# Patient Record
Sex: FEMALE | Race: WHITE | Employment: OTHER | URBAN - METROPOLITAN AREA
[De-identification: names, ages, dates, MRNs, and addresses within clinical notes are randomized per-mention and may not be internally consistent; named-entity substitution may affect disease eponyms.]

---

## 2018-05-03 PROBLEM — Z79.01 LONG TERM (CURRENT) USE OF ANTICOAGULANTS: Status: ACTIVE | Noted: 2018-05-03

## 2021-05-14 ENCOUNTER — HOSPITAL ENCOUNTER (OUTPATIENT)
Dept: LAB | Age: 71
Discharge: HOME | End: 2021-05-14
Payer: MEDICARE

## 2021-05-14 DIAGNOSIS — I48.21 PERMANENT ATRIAL FIBRILLATION (HCC): ICD-10-CM

## 2021-05-14 LAB
ANION GAP SERPL CALC-SCNC: 5 MMOL/L (ref 7–16)
BASOPHILS # BLD: 0 K/UL (ref 0–0.2)
BASOPHILS NFR BLD: 1 % (ref 0–2)
BUN SERPL-MCNC: 21 MG/DL (ref 8–23)
CALCIUM SERPL-MCNC: 8.9 MG/DL (ref 8.3–10.4)
CHLORIDE SERPL-SCNC: 103 MMOL/L (ref 98–107)
CO2 SERPL-SCNC: 30 MMOL/L (ref 21–32)
CREAT SERPL-MCNC: 1.35 MG/DL (ref 0.6–1)
DIFFERENTIAL METHOD BLD: NORMAL
EOSINOPHIL # BLD: 0.1 K/UL (ref 0–0.8)
EOSINOPHIL NFR BLD: 2 % (ref 0.5–7.8)
ERYTHROCYTE [DISTWIDTH] IN BLOOD BY AUTOMATED COUNT: 12 % (ref 11.9–14.6)
GLUCOSE SERPL-MCNC: 128 MG/DL (ref 65–100)
HCT VFR BLD AUTO: 42.1 % (ref 35.8–46.3)
HGB BLD-MCNC: 13.5 G/DL (ref 11.7–15.4)
IMM GRANULOCYTES # BLD AUTO: 0 K/UL (ref 0–0.5)
IMM GRANULOCYTES NFR BLD AUTO: 0 % (ref 0–5)
INR PPP: 2.9
LYMPHOCYTES # BLD: 1.7 K/UL (ref 0.5–4.6)
LYMPHOCYTES NFR BLD: 25 % (ref 13–44)
MAGNESIUM SERPL-MCNC: 1.9 MG/DL (ref 1.8–2.4)
MCH RBC QN AUTO: 30.4 PG (ref 26.1–32.9)
MCHC RBC AUTO-ENTMCNC: 32.1 G/DL (ref 31.4–35)
MCV RBC AUTO: 94.8 FL (ref 79.6–97.8)
MONOCYTES # BLD: 0.5 K/UL (ref 0.1–1.3)
MONOCYTES NFR BLD: 8 % (ref 4–12)
NEUTS SEG # BLD: 4.4 K/UL (ref 1.7–8.2)
NEUTS SEG NFR BLD: 65 % (ref 43–78)
NRBC # BLD: 0 K/UL (ref 0–0.2)
PLATELET # BLD AUTO: 217 K/UL (ref 150–450)
PMV BLD AUTO: 10 FL (ref 9.4–12.3)
POTASSIUM SERPL-SCNC: 4.3 MMOL/L (ref 3.5–5.1)
PROTHROMBIN TIME: 30.3 SEC (ref 12.5–14.7)
RBC # BLD AUTO: 4.44 M/UL (ref 4.05–5.2)
SODIUM SERPL-SCNC: 138 MMOL/L (ref 136–145)
WBC # BLD AUTO: 6.7 K/UL (ref 4.3–11.1)

## 2021-05-14 PROCEDURE — 80048 BASIC METABOLIC PNL TOTAL CA: CPT

## 2021-05-14 PROCEDURE — 83735 ASSAY OF MAGNESIUM: CPT

## 2021-05-14 PROCEDURE — 36415 COLL VENOUS BLD VENIPUNCTURE: CPT

## 2021-05-14 PROCEDURE — 85025 COMPLETE CBC W/AUTO DIFF WBC: CPT

## 2021-05-14 PROCEDURE — 85610 PROTHROMBIN TIME: CPT

## 2021-05-20 ENCOUNTER — ANESTHESIA EVENT (OUTPATIENT)
Dept: SURGERY | Age: 71
End: 2021-05-20
Payer: MEDICARE

## 2021-05-20 NOTE — PROGRESS NOTES
Patient pre-assessment complete for Gen Change  scheduled for 1100, arrival time 0930. Patient verified using . Patient instructed to bring all home medications in labeled bottles on the day of procedure. NPO status reinforced. Patient instructed to HOLD all DM medications, coumadin. Instructed they can take all other medications excluding vitamins & supplements. Patient verbalizes understanding of all instructions & denies any questions at this time.

## 2021-05-21 ENCOUNTER — ANESTHESIA (OUTPATIENT)
Dept: SURGERY | Age: 71
End: 2021-05-21
Payer: MEDICARE

## 2021-05-21 ENCOUNTER — HOSPITAL ENCOUNTER (OUTPATIENT)
Dept: CARDIAC CATH/INVASIVE PROCEDURES | Age: 71
Discharge: HOME | End: 2021-05-21
Attending: INTERNAL MEDICINE | Admitting: INTERNAL MEDICINE
Payer: MEDICARE

## 2021-05-21 VITALS
SYSTOLIC BLOOD PRESSURE: 139 MMHG | RESPIRATION RATE: 12 BRPM | HEART RATE: 86 BPM | HEIGHT: 68 IN | WEIGHT: 290 LBS | DIASTOLIC BLOOD PRESSURE: 86 MMHG | BODY MASS INDEX: 43.95 KG/M2 | OXYGEN SATURATION: 95 % | TEMPERATURE: 97.8 F

## 2021-05-21 DIAGNOSIS — I48.21 PERMANENT ATRIAL FIBRILLATION (HCC): ICD-10-CM

## 2021-05-21 DIAGNOSIS — I10 ESSENTIAL HYPERTENSION: ICD-10-CM

## 2021-05-21 DIAGNOSIS — Z95.0 CARDIAC PACEMAKER: ICD-10-CM

## 2021-05-21 DIAGNOSIS — I49.5 SSS (SICK SINUS SYNDROME) (HCC): ICD-10-CM

## 2021-05-21 LAB
ALBUMIN SERPL-MCNC: 3.8 G/DL (ref 3.2–4.6)
ALBUMIN/GLOB SERPL: 1.1 {RATIO} (ref 1.2–3.5)
ALP SERPL-CCNC: 73 U/L (ref 50–136)
ALT SERPL-CCNC: 19 U/L (ref 12–65)
ANION GAP SERPL CALC-SCNC: 4 MMOL/L (ref 7–16)
AST SERPL-CCNC: 18 U/L (ref 15–37)
ATRIAL RATE: 94 BPM
BASOPHILS # BLD: 0 K/UL (ref 0–0.2)
BASOPHILS NFR BLD: 1 % (ref 0–2)
BILIRUB SERPL-MCNC: 0.9 MG/DL (ref 0.2–1.1)
BUN SERPL-MCNC: 26 MG/DL (ref 8–23)
CALCIUM SERPL-MCNC: 9.5 MG/DL (ref 8.3–10.4)
CALCULATED R AXIS, ECG10: 24 DEGREES
CALCULATED T AXIS, ECG11: 77 DEGREES
CHLORIDE SERPL-SCNC: 105 MMOL/L (ref 98–107)
CO2 SERPL-SCNC: 29 MMOL/L (ref 21–32)
CREAT SERPL-MCNC: 1.34 MG/DL (ref 0.6–1)
DIAGNOSIS, 93000: NORMAL
DIFFERENTIAL METHOD BLD: NORMAL
EOSINOPHIL # BLD: 0.1 K/UL (ref 0–0.8)
EOSINOPHIL NFR BLD: 2 % (ref 0.5–7.8)
ERYTHROCYTE [DISTWIDTH] IN BLOOD BY AUTOMATED COUNT: 12 % (ref 11.9–14.6)
GLOBULIN SER CALC-MCNC: 3.4 G/DL (ref 2.3–3.5)
GLUCOSE SERPL-MCNC: 125 MG/DL (ref 65–100)
HCT VFR BLD AUTO: 41.4 % (ref 35.8–46.3)
HGB BLD-MCNC: 13.6 G/DL (ref 11.7–15.4)
IMM GRANULOCYTES # BLD AUTO: 0 K/UL (ref 0–0.5)
IMM GRANULOCYTES NFR BLD AUTO: 0 % (ref 0–5)
INR PPP: 1.2
LYMPHOCYTES # BLD: 1.5 K/UL (ref 0.5–4.6)
LYMPHOCYTES NFR BLD: 24 % (ref 13–44)
MAGNESIUM SERPL-MCNC: 2 MG/DL (ref 1.8–2.4)
MCH RBC QN AUTO: 30.4 PG (ref 26.1–32.9)
MCHC RBC AUTO-ENTMCNC: 32.9 G/DL (ref 31.4–35)
MCV RBC AUTO: 92.4 FL (ref 79.6–97.8)
MONOCYTES # BLD: 0.6 K/UL (ref 0.1–1.3)
MONOCYTES NFR BLD: 9 % (ref 4–12)
NEUTS SEG # BLD: 4.2 K/UL (ref 1.7–8.2)
NEUTS SEG NFR BLD: 65 % (ref 43–78)
NRBC # BLD: 0 K/UL (ref 0–0.2)
PLATELET # BLD AUTO: 202 K/UL (ref 150–450)
PMV BLD AUTO: 9.6 FL (ref 9.4–12.3)
POTASSIUM SERPL-SCNC: 4.1 MMOL/L (ref 3.5–5.1)
PROT SERPL-MCNC: 7.2 G/DL (ref 6.3–8.2)
PROTHROMBIN TIME: 15.3 SEC (ref 12.5–14.7)
Q-T INTERVAL, ECG07: 298 MS
QRS DURATION, ECG06: 86 MS
QTC CALCULATION (BEZET), ECG08: 368 MS
RBC # BLD AUTO: 4.48 M/UL (ref 4.05–5.2)
SODIUM SERPL-SCNC: 138 MMOL/L (ref 136–145)
VENTRICULAR RATE, ECG03: 92 BPM
WBC # BLD AUTO: 6.4 K/UL (ref 4.3–11.1)

## 2021-05-21 PROCEDURE — 77030010507 HC ADH SKN DERMBND J&J -B

## 2021-05-21 PROCEDURE — 74011000272 HC RX REV CODE- 272: Performed by: INTERNAL MEDICINE

## 2021-05-21 PROCEDURE — 77030022704 HC SUT VLOC COVD -B

## 2021-05-21 PROCEDURE — 83735 ASSAY OF MAGNESIUM: CPT

## 2021-05-21 PROCEDURE — 74011000250 HC RX REV CODE- 250: Performed by: INTERNAL MEDICINE

## 2021-05-21 PROCEDURE — 80053 COMPREHEN METABOLIC PANEL: CPT

## 2021-05-21 PROCEDURE — 33227 REMOVE&REPLACE PM GEN SINGL: CPT

## 2021-05-21 PROCEDURE — 74011000250 HC RX REV CODE- 250: Performed by: STUDENT IN AN ORGANIZED HEALTH CARE EDUCATION/TRAINING PROGRAM

## 2021-05-21 PROCEDURE — 76060000033 HC ANESTHESIA 1 TO 1.5 HR: Performed by: INTERNAL MEDICINE

## 2021-05-21 PROCEDURE — 77030041279 HC DRSG PRMSL AG MDII -B

## 2021-05-21 PROCEDURE — 74011000250 HC RX REV CODE- 250: Performed by: NURSE ANESTHETIST, CERTIFIED REGISTERED

## 2021-05-21 PROCEDURE — 77030031304 HC WAVWGD EIGR DISP INVO -D

## 2021-05-21 PROCEDURE — 85610 PROTHROMBIN TIME: CPT

## 2021-05-21 PROCEDURE — 33227 REMOVE&REPLACE PM GEN SINGL: CPT | Performed by: INTERNAL MEDICINE

## 2021-05-21 PROCEDURE — 93005 ELECTROCARDIOGRAM TRACING: CPT | Performed by: INTERNAL MEDICINE

## 2021-05-21 PROCEDURE — C1786 PMKR, SINGLE, RATE-RESP: HCPCS

## 2021-05-21 PROCEDURE — 77030033067 HC SUT PDO STRATFX SPIR J&J -B

## 2021-05-21 PROCEDURE — 74011250636 HC RX REV CODE- 250/636: Performed by: INTERNAL MEDICINE

## 2021-05-21 PROCEDURE — 85025 COMPLETE CBC W/AUTO DIFF WBC: CPT

## 2021-05-21 PROCEDURE — 74011250636 HC RX REV CODE- 250/636: Performed by: NURSE ANESTHETIST, CERTIFIED REGISTERED

## 2021-05-21 RX ORDER — SODIUM CHLORIDE, SODIUM LACTATE, POTASSIUM CHLORIDE, CALCIUM CHLORIDE 600; 310; 30; 20 MG/100ML; MG/100ML; MG/100ML; MG/100ML
INJECTION, SOLUTION INTRAVENOUS
Status: DISCONTINUED | OUTPATIENT
Start: 2021-05-21 | End: 2021-05-21 | Stop reason: HOSPADM

## 2021-05-21 RX ORDER — PROPOFOL 10 MG/ML
INJECTION, EMULSION INTRAVENOUS
Status: DISCONTINUED | OUTPATIENT
Start: 2021-05-21 | End: 2021-05-21 | Stop reason: HOSPADM

## 2021-05-21 RX ORDER — SODIUM CHLORIDE 9 MG/ML
75 INJECTION, SOLUTION INTRAVENOUS CONTINUOUS
Status: DISCONTINUED | OUTPATIENT
Start: 2021-05-21 | End: 2021-05-21 | Stop reason: HOSPADM

## 2021-05-21 RX ORDER — DOXYCYCLINE 100 MG/1
100 TABLET ORAL 2 TIMES DAILY
Qty: 10 TABLET | Refills: 0 | Status: SHIPPED | OUTPATIENT
Start: 2021-05-21 | End: 2021-05-26

## 2021-05-21 RX ORDER — LIDOCAINE HYDROCHLORIDE 20 MG/ML
INJECTION, SOLUTION EPIDURAL; INFILTRATION; INTRACAUDAL; PERINEURAL AS NEEDED
Status: DISCONTINUED | OUTPATIENT
Start: 2021-05-21 | End: 2021-05-21 | Stop reason: HOSPADM

## 2021-05-21 RX ORDER — CEFAZOLIN SODIUM/WATER 2 G/20 ML
2 SYRINGE (ML) INTRAVENOUS
Status: DISCONTINUED | OUTPATIENT
Start: 2021-05-21 | End: 2021-05-21 | Stop reason: DRUGHIGH

## 2021-05-21 RX ORDER — PROPOFOL 10 MG/ML
INJECTION, EMULSION INTRAVENOUS AS NEEDED
Status: DISCONTINUED | OUTPATIENT
Start: 2021-05-21 | End: 2021-05-21 | Stop reason: HOSPADM

## 2021-05-21 RX ADMIN — SODIUM CHLORIDE, SODIUM LACTATE, POTASSIUM CHLORIDE, AND CALCIUM CHLORIDE: 600; 310; 30; 20 INJECTION, SOLUTION INTRAVENOUS at 12:02

## 2021-05-21 RX ADMIN — PHENYLEPHRINE HYDROCHLORIDE 50 MCG: 10 INJECTION INTRAVENOUS at 12:26

## 2021-05-21 RX ADMIN — LIDOCAINE HYDROCHLORIDE 200 MG: 10; .005 INJECTION, SOLUTION EPIDURAL; INFILTRATION; INTRACAUDAL; PERINEURAL at 12:19

## 2021-05-21 RX ADMIN — CEFAZOLIN 3 G: 1 INJECTION, POWDER, FOR SOLUTION INTRAVENOUS at 12:12

## 2021-05-21 RX ADMIN — PHENYLEPHRINE HYDROCHLORIDE 50 MCG: 10 INJECTION INTRAVENOUS at 12:23

## 2021-05-21 RX ADMIN — LIDOCAINE HYDROCHLORIDE 50 MG: 20 INJECTION, SOLUTION EPIDURAL; INFILTRATION; INTRACAUDAL; PERINEURAL at 12:09

## 2021-05-21 RX ADMIN — PHENYLEPHRINE HYDROCHLORIDE 100 MCG: 10 INJECTION INTRAVENOUS at 12:31

## 2021-05-21 RX ADMIN — NEOMYCIN AND POLYMYXIN B SULFATES 1000 ML: 40; 200000 SOLUTION IRRIGATION at 12:19

## 2021-05-21 RX ADMIN — PROPOFOL 140 MCG/KG/MIN: 10 INJECTION, EMULSION INTRAVENOUS at 12:11

## 2021-05-21 RX ADMIN — PROPOFOL 50 MG: 10 INJECTION, EMULSION INTRAVENOUS at 12:11

## 2021-05-21 NOTE — PROCEDURES
: Juan Sanabria. Gerry Jay MD    REFERRING: Aquiles Dominguez. Isabelle Pulido MD    Pre-Procedure Diagnosis  1. PPM generator RYAN. 2. Documented non-reversible symptomatic bradycardia due to sinus node dysfunction    Procedure Performed  1. Single Chamber PPM Gen Change    Anesthesia: MAC     Complications: None     Estimated Blood Loss: Less than 10 mL     Fluoro Exposure: 0 minutes    Specimens: * No specimens in log *     Patient Information and Indications: The procedure, indications, risks, benefits, and alternatives were discussed with the patient and family members, who desired to proceed after questions were answered and informed consent was documented. Procedure: After informed consent was obtained, the patient was brought to the Electrophysiology Laboratory in a fasting state and was prepped and draped in sterile fashion. Prophylactic antibiotic was administered prior to skin incision. Local anesthetic (sensorcaine) was delivered to the left pectoral region and an incision was made directly over the lower prior surgical scar. The subcutaneous pocket was opened using blunt dissection and electrocautery, and adequate hemostasis was established. The device was freed from overlying fibrotic tissue and the lead was freed to give enough slack for device exchange. The lead was removed from the old generator and tested using the PSA revealing excellent pacing parameters. The lead pin was then cleaned with antibiotic soaked gauze, dried gently, and attached to a new pacemaker generator. The pin was directly observed to pass the tip electrode, and the ring hex wrench screws were secured, and leads tug tested. The device and leads were gently positioned within the pocket. The pocket was irrigated copiously with a saline antimicrobial solution prior to device positioning. The device and lead was tested a second time prior to pocket closure.   The wound was closed with two layers of absorbable suture (3-0 Monocryl, Stratafix) followed by skin closure with 4-0 V-Loc in a running fashion. The patient tolerated the procedure well and left the lab in good condition. There were no complications. All sharps and sponges were counted x 2. Device and Lead Information  Pulse Generator Model #  Serial # Location Implant/Explant   [id number] [facility] [id number] Left Pectoral Implant [id number]   Old Device: Shirley Mae's [id number] DOI [id number]    Lead Model Number  Serial Number Lead position Implant/Explant   RV [id number] [facility] [id number] RV [place] Chronic 09.21.2009     Lead Sensitivity and Threshold  Lead R or P sensitivity (mv) Threshold (V) Threshold PW (msec) Impedance (ohms) Final output Voltage (V) Final PW (msec)   RV 5.7 1.3 0.4 463 2.5 0.4     Bradycardia Settings  [place] Mode LRL USL   VVIR 60 130       Impression: 1. Successful single chamber PPM generator replacement. Plan: Same day discharge.  -Abx. -Device clinic follow up in 1-2 weeks.  -Routine cardiac care per Dr. Jorge Alberto Adkins. Marine Hyman MD, [address]  Clinical Cardiac Electrophysiology  [facility] Cardiology    5/21/2021  12:49 PM

## 2021-05-21 NOTE — PROGRESS NOTES
Patient received to [address] [place] [address] room # 9  Ambulatory from [facility]. Patient scheduled for battery change today with Dr Chana Higgins. Procedure reviewed & questions answered, voiced good understanding consent obtained & placed on chart. All medications and medical history reviewed. Will prep patient per orders. Patient & family updated on plan of care.       The patient has a fraility score of 3-MANAGING WELL, based on independent of adl's

## 2021-05-21 NOTE — H&P
[facility] CARDIOLOGY History & Physical                   Assessment/Plan:   Symptomatic bradycardia  Atrial fibrillation  Essential hypertension    72year old female with a history of persistent atrial fibrillation s/p SCPM implant which is now at [facility]. She will require a changeout. She has undergone informed consent and will proceed with planned procedure under MAC. PACEMAKER  Additionally, I discussed with the patient the potential risks of pacemaker implantation, including the risk of bleeding, infection, venous occlusion, DVT/PE, pneumothorax, cardiac tamponade, perforation, need for urgent open heart surgery, device/lead failure, lead dislodgement, heart attack, stroke, arrhythmia, radiation skin injury, kidney damage/failure oversedation, respiratory arrest, and even death. The patient understands these risks in the context of the potential benefits of the device implantation, and agrees to proceed. TOTAL TIME: 25 minutes, >50% during counseling and coordination of care    [employer]. Luis F Marquez MD, MS  Clinical Cardiac Electrophysiology  [address] [facility] Cardiology    5/21/2021  11:53 AM      Subjective:     Patient is a 72 y.o. female who presents with SCPM which is at [facility]. She will undergo device changeout today. The patient otherwise denies chest pain, dyspnea, presyncope, syncope or lateralizing symptoms. Past Medical History:   Diagnosis Date    Arrhythmia     a-fib, pacemaker /  katlyn-tachy at timesprior to pacemaker    Arthritis     self diagnosed in knees    Atrial fibrillation ([address] 10/28/2011    Cardiac pacemaker     Diabetes ([address]     type 2;  BS , oral med.   A1c  6.1     DM w/o complication type II ([address] 10/28/2011    Endometrial hyperplasia     Endometrial hyperplasia with atypia 10/28/2011    GERD (gastroesophageal reflux disease)     nexium daily    Hypertension     Mitral valve regurgitation     Mixed hyperlipidemia     Morbid obesity (HCC)     Neuropathy     feet    Overweight 10/28/2011    SSS (sick sinus syndrome) [address]     Unspecified essential hypertension 10/28/2011      Past Surgical History:   Procedure Laterality Date    HX GYN      D&C x2    HX HEENT      right eye corneal transplant and cat.  HX OTHER SURGICAL      pilonial cyst    HX PACEMAKER      '09    RI ABDOMEN SURGERY PROC UNLISTED      xochitl      Allergies   Allergen Reactions    Ciprofloxacin Other (comments)     hyperactivity    Pcn [Penicillins] Unknown (comments)     As a child    Sulfa (Sulfonamide Antibiotics) Rash     Social History     Tobacco Use    Smoking status: Never Smoker    Smokeless tobacco: Never Used   Substance Use Topics    Alcohol use: Yes     Comment: occassional-no more than once monthly      FH:   Family History   Problem Relation Age of Onset   Shahida Schaeffer Cancer Mother     Hypertension Mother     Heart Disease Mother     Diabetes Father     Stroke Father     Heart Disease Father         ROS      Objective:       Visit Vitals  BP (!) 136/93 (BP 1 Location: Left upper arm)   Pulse 91   Ht 5' 8\" (1.727 m)   Wt 290 lb (131.5 kg)   SpO2 96%   Breastfeeding No   BMI 44.09 kg/m²       No intake/output data recorded. No intake/output data recorded. Physical Exam  Vitals and nursing note reviewed. HENT:      Head: Normocephalic. Mouth/Throat:      Pharynx: No oropharyngeal exudate. Eyes:      General: No scleral icterus. Pupils: Pupils are equal, round, and reactive to light. Neck:      Vascular: No JVD. Cardiovascular:      Rate and Rhythm: Normal rate and regular rhythm. Heart sounds: Normal heart sounds. No murmur heard. No friction rub. No gallop. Pulmonary:      Effort: Pulmonary effort is normal. No respiratory distress. Breath sounds: Normal breath sounds. No wheezing or rales. Abdominal:      General: Bowel sounds are normal. There is no distension. Palpations: Abdomen is soft. Tenderness: There is no abdominal tenderness. There is no rebound. Musculoskeletal:         General: No tenderness. Normal range of motion. Cervical back: Normal range of motion and neck supple. Skin:     General: Skin is warm and dry. Findings: No erythema or rash. Neurological:      Mental Status: She is alert and oriented to person, place, and time. Cranial Nerves: No cranial nerve deficit. Motor: No abnormal muscle tone. Coordination: Coordination normal.   Psychiatric:         Mood and Affect: Affect normal.         Judgment: Judgment normal.       ECG: Atrial fibrillation, nonspecific T wave abnormality.      Data Review:   Labs:   Recent Labs     05/21/21  0923      K 4.1   MG 2.0   BUN 26*   CREA 1.34*   *   WBC 6.4   HGB 13.6   HCT 41.4      INR 1.2

## 2021-05-21 NOTE — ANESTHESIA PREPROCEDURE EVALUATION
Relevant Problems   RESPIRATORY SYSTEM   (+) Sleep apnea      CARDIOVASCULAR   (+) Arrhythmia   (+) Atrial fibrillation (HCC)   (+) Cardiac pacemaker   (+) Essential hypertension   (+) Mitral valve regurgitation   (+) SSS (sick sinus syndrome) (HCC)      GASTROINTESTINAL   (+) GERD (gastroesophageal reflux disease)      ENDOCRINE   (+) Arthritis   (+) Morbid obesity (HCC)   (+) Type II or unspecified type diabetes mellitus without mention of complication, not stated as uncontrolled       Anesthetic History   No history of anesthetic complications            Review of Systems / Medical History  Patient summary reviewed and pertinent labs reviewed    Pulmonary        Sleep apnea: CPAP           Neuro/Psych   Within defined limits           Cardiovascular    Hypertension        Dysrhythmias (SSS - on coumadin - INR 1.2) : atrial fibrillation  Pacemaker    Exercise tolerance: <4 METS  Comments: ECHO '20 - preserved EF with mod AI/mild MR   GI/Hepatic/Renal  Within defined limits              Endo/Other    Diabetes: well controlled, type 2    Morbid obesity     Other Findings            Physical Exam    Airway  Mallampati: II  TM Distance: 4 - 6 cm  Neck ROM: decreased range of motion   Mouth opening: Normal     Cardiovascular    Rhythm: regular  Rate: normal         Dental         Pulmonary  Breath sounds clear to auscultation               Abdominal         Other Findings            Anesthetic Plan    ASA: 3  Anesthesia type: total IV anesthesia          Induction: Intravenous  Anesthetic plan and risks discussed with: Patient

## 2021-05-21 NOTE — DISCHARGE INSTRUCTIONS
PACEMAKER INSTRUCTIONS SHEET  · Keep your incision dry for 10 days. DO NOT put salves, ointments, and/or lotions on the incision. Only take a tub bath during this time; NO showers. · The pieces of tape on the incision will come off by themselves when you begin washing the site. Please do not pull or tear them off. · You may use your pacemaker arm; but DO NOT raise the arm higher than your shoulder for the first two weeks to prevent the pacemaker lead from moving. DO NOT immobilize your pacemaker arm. · Call us IMMEDIATELY if you develop fever, pain, redness, and/or drainage at the pacemaker arm. · Do not lift more than 10 pounds for 2 weeks and 20 pounds for 1 month. · Microwaves WILL NOT harm your pacemaker. Warning does not apply to you. · Avoid activities which can reprogram your pacemaker such as arc welding, ham radios, and tanning booths. At airports, always show your pacemaker identification card. You may walk through the metal detector, but do not allow the hand held wand near your pacemaker. Do not have a MRI or NMR scan without talking to your cardiologist.  · Your pacemakers function will be evaluated over the telephone. Within 3 weeks you should receive a schedule. If you do not receive a schedule, or if you have questions, you may call [facility] Cardiology. · Remove the battery from the transmitter after each use. Always keep a spare 9 volt battery. · The pacemaker battery is tested by the heart rate with the magnet applied. This test is done each time you transmit your ECG so it is very important that you follow your schedule. · Carry your pacemaker identification card at all times. Within 6 weeks, you should receive your permanent card. Keep your temporary card as a spare. Call [facility] Cardiology if you do not receive your card or if you lose your card. · Always show the doctor or dentist your pacemaker identification card.   · If you have questions about your pacemaker or office appointment, please call the office of [facility] Cardiology at [phone]. · Following the pacemaker implant, you will need to return to the clinic to see your doctor within 1 week. If you did not receive an appointment, please call [phone]. Patient Education      Doxycycline (Acticlate, Adoxa, Avidoxy, Monodox) - (By mouth)   Why this medicine is used:   Treats and prevents infections, treats rosacea, or severe acne. Contact a nurse or doctor right away if you have:  · Blistering, peeling, red skin rash  · Severe or bloody diarrhea  · Severe headache, dizziness, vision changes  · Burning, pain, or irritation in your upper stomach or throat  · Sudden and severe stomach pain, nausea, vomiting, lightheadedness     Common side effects:  · Discoloration of teeth in children  · Sores or white patches on your lips, mouth, or throat  © 2017 [facility] Information is for End User's use only and may not be sold, redistributed or otherwise used for commercial purposes.

## 2021-05-21 NOTE — ANESTHESIA POSTPROCEDURE EVALUATION
Procedure(s):  PACEMAKER GEN CHANGE.    total IV anesthesia    Anesthesia Post Evaluation      Multimodal analgesia: multimodal analgesia used between 6 hours prior to anesthesia start to PACU discharge  Patient location during evaluation: bedside  Patient participation: complete - patient participated  Level of consciousness: awake and responsive to light touch  Pain management: adequate  Airway patency: patent  Anesthetic complications: no  Cardiovascular status: acceptable, hemodynamically stable, blood pressure returned to baseline and stable  Respiratory status: acceptable, unassisted, spontaneous ventilation and nonlabored ventilation  Hydration status: acceptable  Post anesthesia nausea and vomiting:  controlled      INITIAL Post-op Vital signs:   Vitals Value Taken Time   /90 05/21/21 1356   Temp     Pulse 89 05/21/21 1359   Resp     SpO2 96 % 05/21/21 1359   Vitals shown include unvalidated device data.

## 2022-03-19 PROBLEM — Z79.01 LONG TERM (CURRENT) USE OF ANTICOAGULANTS: Status: ACTIVE | Noted: 2018-05-03

## 2022-05-27 ENCOUNTER — ANTI-COAG VISIT (OUTPATIENT)
Dept: CARDIOLOGY CLINIC | Age: 72
End: 2022-05-27
Payer: MEDICARE

## 2022-05-27 DIAGNOSIS — I48.91 ATRIAL FIBRILLATION (HCC): ICD-10-CM

## 2022-05-27 DIAGNOSIS — Z79.01 LONG TERM (CURRENT) USE OF ANTICOAGULANTS: Primary | ICD-10-CM

## 2022-05-27 LAB
POC INR: 2.1
PROTHROMBIN TIME, POC: NORMAL
VALID INTERNAL CONTROL, POC: YES

## 2022-05-27 PROCEDURE — 85610 PROTHROMBIN TIME: CPT | Performed by: INTERNAL MEDICINE

## 2022-05-27 PROCEDURE — 93793 ANTICOAG MGMT PT WARFARIN: CPT | Performed by: INTERNAL MEDICINE

## 2022-05-27 NOTE — PATIENT INSTRUCTIONS
Reminder: Please contact the Coumadin Clinic at [phone]  when you have medication changes. Examples, new medications, antibiotics, discontinued medications, new supplements, missed doses of warfarin or if you took extra doses of warfarin. This also includes OTC medications. Notifying us helps reduce the possibility of high and low INR's. In addition, if warfarin needs to be held for any procedures, please have surgeon or physician's office contact us before holding anticoagulant. Thanks, [facility] Cardiology Coumadin Clinic.

## 2022-05-27 NOTE — PROGRESS NOTES
Anticoagulation Summary  As of 2022    INR goal:  2.0-3.0   TTR:  --   INR used for dosin.1 (2022)   Warfarin maintenance plan:  5 mg (5 mg x 1) every Mon; 7.5 mg (5 mg x 1.5) all other days   Weekly warfarin total:  50 mg   Plan last modified:  Vish Cardoso MA (2022)   Next INR check:     Target end date:   Indefinite    Indications    Long term (current) use of anticoagulants [Z79.01]  Atrial fibrillation ([facility] [address] [I48.91]             Anticoagulation Episode Summary     INR check location:  Anticoagulation Clinic    Preferred lab:      Send INR reminders to:  \[facility]\"" CARDIOLOGY ITA PT    Comments:        Anticoagulation Care Providers     Provider Role Specialty Phone number    Ava Witt MD [facility] Cardiology [phone]

## 2022-06-24 ENCOUNTER — ANTI-COAG VISIT (OUTPATIENT)
Dept: CARDIOLOGY CLINIC | Age: 72
End: 2022-06-24
Payer: MEDICARE

## 2022-06-24 DIAGNOSIS — Z79.01 LONG TERM (CURRENT) USE OF ANTICOAGULANTS: Primary | ICD-10-CM

## 2022-06-24 DIAGNOSIS — I48.91 ATRIAL FIBRILLATION (HCC): ICD-10-CM

## 2022-06-24 LAB
POC INR: 2.4
PROTHROMBIN TIME, POC: NORMAL
VALID INTERNAL CONTROL, POC: YES

## 2022-06-24 PROCEDURE — 85610 PROTHROMBIN TIME: CPT | Performed by: INTERNAL MEDICINE

## 2022-06-24 PROCEDURE — 93793 ANTICOAG MGMT PT WARFARIN: CPT | Performed by: INTERNAL MEDICINE

## 2022-06-24 NOTE — PATIENT INSTRUCTIONS
Reminder: Please contact the Coumadin Clinic at [phone]  when you have medication changes. Examples, new medications, antibiotics, discontinued medications, new supplements, missed doses of warfarin or if you took extra doses of warfarin. This also includes OTC medications. Notifying us helps reduce the possibility of high and low INR's. In addition, if warfarin needs to be held for any procedures, please have surgeon or physician's office contact us before holding anticoagulant. Thanks, [facility] Cardiology Coumadin Clinic.

## 2022-06-24 NOTE — PROGRESS NOTES
Anticoagulation Summary  As of 2022    INR goal:  2.0-3.0   TTR:  100.0 % (2.6 wk)   INR used for dosin.4 (2022)   Warfarin maintenance plan:  5 mg (5 mg x 1) every Wed; 7.5 mg (5 mg x 1.5) all other days   Weekly warfarin total:  50 mg   Plan last modified:  Samantha Salazar MA (2022)   Next INR check:  2022   Target end date:   Indefinite    Indications    Long term (current) use of anticoagulants [Z79.01]  Atrial fibrillation ([facility] [address] [I48.91]             Anticoagulation Episode Summary     INR check location:  Anticoagulation Clinic    Preferred lab:      Send INR reminders to:  \[facility]\"" CARDIOLOGY ITA HARRIS    Comments:        Anticoagulation Care Providers     Provider Role Specialty Phone number    Leah Álvarez MD [facility] Cardiology [phone]

## 2022-07-26 ENCOUNTER — ANTI-COAG VISIT (OUTPATIENT)
Dept: CARDIOLOGY CLINIC | Age: 72
End: 2022-07-26
Payer: MEDICARE

## 2022-07-26 DIAGNOSIS — Z79.01 LONG TERM (CURRENT) USE OF ANTICOAGULANTS: Primary | ICD-10-CM

## 2022-07-26 DIAGNOSIS — I48.91 ATRIAL FIBRILLATION (HCC): ICD-10-CM

## 2022-07-26 LAB
POC INR: 2.3
PROTHROMBIN TIME, POC: NORMAL

## 2022-07-26 PROCEDURE — 93793 ANTICOAG MGMT PT WARFARIN: CPT | Performed by: INTERNAL MEDICINE

## 2022-07-26 PROCEDURE — 85610 PROTHROMBIN TIME: CPT | Performed by: INTERNAL MEDICINE

## 2022-07-26 NOTE — PROGRESS NOTES
Anticoagulation Summary  As of 2022      INR goal:  2.0-3.0   TTR:  100.0 % (1.7 mo)   INR used for dosin.3 (2022)   Warfarin maintenance plan:  5 mg (5 mg x 1) every Wed; 7.5 mg (5 mg x 1.5) all other days   Weekly warfarin total:  50 mg   Plan last modified:  Miguel Lombardo MA (2022)   Next INR check:  2022   Target end date:   Indefinite    Indications    Long term (current) use of anticoagulants [Z79.01]  Atrial fibrillation ([facility] [address] [I48.91]                 Anticoagulation Episode Summary       INR check location:  Anticoagulation Clinic    Preferred lab:      Send INR reminders to:  [facility] CARDIOLOGY ITA PT    Comments:            Anticoagulation Care Providers       Provider Role Specialty Phone number    Linette Sánchez MD [facility] Cardiology [phone]

## 2022-08-05 NOTE — TELEPHONE ENCOUNTER
Warfarin 5mg - 1 to 1.5 tablets daily verified in last AntiCoag note, dated 7/26/22. Medication pended & sent to Dr. Shira Zheng for signature.  //pg

## 2022-08-08 RX ORDER — WARFARIN SODIUM 5 MG/1
TABLET ORAL
Qty: 135 TABLET | Refills: 3 | Status: SHIPPED | OUTPATIENT
Start: 2022-08-08

## 2022-08-23 ENCOUNTER — ANTI-COAG VISIT (OUTPATIENT)
Dept: CARDIOLOGY CLINIC | Age: 72
End: 2022-08-23
Payer: MEDICARE

## 2022-08-23 DIAGNOSIS — I48.91 ATRIAL FIBRILLATION (HCC): ICD-10-CM

## 2022-08-23 DIAGNOSIS — Z79.01 LONG TERM (CURRENT) USE OF ANTICOAGULANTS: Primary | ICD-10-CM

## 2022-08-23 LAB
POC INR: 2.8
PROTHROMBIN TIME, POC: NORMAL

## 2022-08-23 PROCEDURE — 85610 PROTHROMBIN TIME: CPT | Performed by: INTERNAL MEDICINE

## 2022-08-23 PROCEDURE — 93793 ANTICOAG MGMT PT WARFARIN: CPT | Performed by: INTERNAL MEDICINE

## 2022-09-12 RX ORDER — CARVEDILOL 25 MG/1
TABLET ORAL
Qty: 180 TABLET | OUTPATIENT
Start: 2022-09-12

## 2022-09-12 RX ORDER — LOSARTAN POTASSIUM AND HYDROCHLOROTHIAZIDE 25; 100 MG/1; MG/1
1 TABLET ORAL DAILY
Qty: 90 TABLET | Refills: 1 | Status: SHIPPED | OUTPATIENT
Start: 2022-09-12

## 2022-09-12 RX ORDER — CARVEDILOL 25 MG/1
25 TABLET ORAL 2 TIMES DAILY WITH MEALS
Qty: 180 TABLET | Refills: 1 | Status: SHIPPED | OUTPATIENT
Start: 2022-09-12

## 2022-09-12 NOTE — TELEPHONE ENCOUNTER
Pt needs refills on Losartan-hydrochlorothiazide 100-25 mg and  Carvedilol 25 mg sent [facility] in [place] on [facility] [address] [facility]. Please call pt with any questions. Thank you.

## 2022-09-12 NOTE — TELEPHONE ENCOUNTER
Losartan HCTZ 100-25mg daily & Carvedilol 25mg BID verified in last office note.  Medications pended & sent to Dr. Ness Vizcarra for approval. //pg

## 2022-09-20 ENCOUNTER — ANTI-COAG VISIT (OUTPATIENT)
Dept: CARDIOLOGY CLINIC | Age: 72
End: 2022-09-20
Payer: MEDICARE

## 2022-09-20 DIAGNOSIS — Z79.01 LONG TERM (CURRENT) USE OF ANTICOAGULANTS: Primary | ICD-10-CM

## 2022-09-20 DIAGNOSIS — I48.91 ATRIAL FIBRILLATION (HCC): ICD-10-CM

## 2022-09-20 LAB
POC INR: 1.5
PROTHROMBIN TIME, POC: YES

## 2022-09-20 PROCEDURE — 85610 PROTHROMBIN TIME: CPT | Performed by: INTERNAL MEDICINE

## 2022-09-20 PROCEDURE — 93793 ANTICOAG MGMT PT WARFARIN: CPT | Performed by: INTERNAL MEDICINE

## 2022-09-20 NOTE — PATIENT INSTRUCTIONS
Reminder: Please contact the Coumadin Clinic at [phone]  when you have medication changes. Examples, new medications, antibiotics, discontinued medications, new supplements, missed doses of warfarin or if you took extra doses of warfarin. This also includes OTC medications. Notifying us helps reduce the possibility of high and low INR's. In addition, if warfarin needs to be held for any procedures, please have surgeon or physician's office contact us before holding anticoagulant. Thanks, [facility] Cardiology Coumadin Clinic.

## 2022-09-20 NOTE — PROGRESS NOTES
Pt is below range, this is likely due to pt missing several days of warfarin. Will increase and recheck in 2 weeks//KM    Anticoagulation Summary  As of 2022      INR goal:  2.0-3.0   TTR:  89.9 % (3.5 mo)   INR used for dosin.5 (2022)   Warfarin maintenance plan:  5 mg (5 mg x 1) every Wed; 7.5 mg (5 mg x 1.5) all other days   Weekly warfarin total:  50 mg   Plan last modified:  Winston Tejeda MA (2022)   Next INR check:  10/4/2022   Target end date:   Indefinite    Indications    Long term (current) use of anticoagulants [Z79.01]  Atrial fibrillation ([facility] [address] [I48.91]                 Anticoagulation Episode Summary       INR check location:  Anticoagulation Clinic    Preferred lab:      Send INR reminders to:  [facility] CARDIOLOGY ITA [place]    Comments:            Anticoagulation Care Providers       Provider Role Specialty Phone number    Lauri Avalos MD [facility] Cardiology [phone]

## 2022-10-04 ENCOUNTER — ANTI-COAG VISIT (OUTPATIENT)
Dept: CARDIOLOGY CLINIC | Age: 72
End: 2022-10-04
Payer: MEDICARE

## 2022-10-04 DIAGNOSIS — Z79.01 LONG TERM (CURRENT) USE OF ANTICOAGULANTS: Primary | ICD-10-CM

## 2022-10-04 DIAGNOSIS — I48.91 ATRIAL FIBRILLATION (HCC): ICD-10-CM

## 2022-10-04 LAB
POC INR: 2.1
PROTHROMBIN TIME, POC: YES

## 2022-10-04 PROCEDURE — 85610 PROTHROMBIN TIME: CPT | Performed by: INTERNAL MEDICINE

## 2022-10-04 PROCEDURE — 93793 ANTICOAG MGMT PT WARFARIN: CPT | Performed by: INTERNAL MEDICINE

## 2022-10-04 NOTE — PATIENT INSTRUCTIONS
Reminder: Please contact the Coumadin Clinic at [phone]  when you have medication changes. Examples, new medications, antibiotics, discontinued medications, new supplements, missed doses of warfarin or if you took extra doses of warfarin. This also includes OTC medications. Notifying us helps reduce the possibility of high and low INR's. In addition, if warfarin needs to be held for any procedures, please have surgeon or physician's office contact us before holding anticoagulant. Thanks, [facility] Cardiology Coumadin Clinic.

## 2022-10-04 NOTE — PROGRESS NOTES
Anticoagulation Summary  As of 10/4/2022      INR goal:  2.0-3.0   TTR:  81.3 % (4 mo)   INR used for dosin.1 (10/4/2022)   Warfarin maintenance plan:  5 mg (5 mg x 1) every Wed; 7.5 mg (5 mg x 1.5) all other days   Weekly warfarin total:  50 mg   Plan last modified:  Sofia Edward MA (2022)   Next INR check:  10/18/2022   Target end date:   Indefinite    Indications    Long term (current) use of anticoagulants [Z79.01]  Atrial fibrillation ([facility] [address] [I48.91]                 Anticoagulation Episode Summary       INR check location:  Anticoagulation Clinic    Preferred lab:      Send INR reminders to:  [facility] CARDIOLOGY ITA PT    Comments:            Anticoagulation Care Providers       Provider Role Specialty Phone number    Girma Velasquez MD [facility] Cardiology [phone]

## 2022-10-18 ENCOUNTER — ANTI-COAG VISIT (OUTPATIENT)
Dept: CARDIOLOGY CLINIC | Age: 72
End: 2022-10-18
Payer: MEDICARE

## 2022-10-18 DIAGNOSIS — Z79.01 LONG TERM (CURRENT) USE OF ANTICOAGULANTS: Primary | ICD-10-CM

## 2022-10-18 DIAGNOSIS — I48.91 ATRIAL FIBRILLATION (HCC): ICD-10-CM

## 2022-10-18 LAB
POC INR: 3.8
PROTHROMBIN TIME, POC: YES

## 2022-10-18 PROCEDURE — 93793 ANTICOAG MGMT PT WARFARIN: CPT | Performed by: INTERNAL MEDICINE

## 2022-10-18 PROCEDURE — 85610 PROTHROMBIN TIME: CPT | Performed by: INTERNAL MEDICINE

## 2022-10-18 NOTE — PATIENT INSTRUCTIONS
Reminder: Please contact the Coumadin Clinic at [phone]  when you have medication changes. Examples, new medications, antibiotics, discontinued medications, new supplements, missed doses of warfarin or if you took extra doses of warfarin. This also includes OTC medications. Notifying us helps reduce the possibility of high and low INR's. In addition, if warfarin needs to be held for any procedures, please have surgeon or physician's office contact us before holding anticoagulant. Thanks, [facility] Cardiology Coumadin Clinic.

## 2022-10-18 NOTE — PROGRESS NOTES
Pt is above range, pt states that she has not consumed her normal amount of Vit K. Will decrease and recheck in 2 weeks//KM    Anticoagulation Summary  As of 10/18/2022      INR goal:  2.0-3.0   TTR:  78.4 % (4.5 mo)   INR used for dosing:  3.8 (10/18/2022)   Warfarin maintenance plan:  5 mg (5 mg x 1) every Wed; 7.5 mg (5 mg x 1.5) all other days   Weekly warfarin total:  50 mg   Plan last modified:  Yesi Ramsey MA (5/27/2022)   Next INR check:     Target end date:   Indefinite    Indications    Long term (current) use of anticoagulants [Z79.01]  Atrial fibrillation ([facility] [address] [I48.91]                 Anticoagulation Episode Summary       INR check location:  Anticoagulation Clinic    Preferred lab:      Send INR reminders to:  [facility] CARDIOLOGY ITA [place]    Comments:            Anticoagulation Care Providers       Provider Role Specialty Phone number    Darien Farley MD [facility] Cardiology [phone]

## 2022-11-01 ENCOUNTER — ANTI-COAG VISIT (OUTPATIENT)
Dept: CARDIOLOGY CLINIC | Age: 72
End: 2022-11-01
Payer: MEDICARE

## 2022-11-01 DIAGNOSIS — Z79.01 LONG TERM (CURRENT) USE OF ANTICOAGULANTS: Primary | ICD-10-CM

## 2022-11-01 DIAGNOSIS — I48.91 ATRIAL FIBRILLATION (HCC): ICD-10-CM

## 2022-11-01 LAB
POC INR: 3.4
PROTHROMBIN TIME, POC: YES

## 2022-11-01 PROCEDURE — 93793 ANTICOAG MGMT PT WARFARIN: CPT | Performed by: INTERNAL MEDICINE

## 2022-11-01 PROCEDURE — 85610 PROTHROMBIN TIME: CPT | Performed by: INTERNAL MEDICINE

## 2022-11-01 NOTE — PATIENT INSTRUCTIONS
Reminder: Please contact the Coumadin Clinic at [phone]  when you have medication changes. Examples, new medications, antibiotics, discontinued medications, new supplements, missed doses of warfarin or if you took extra doses of warfarin. This also includes OTC medications. Notifying us helps reduce the possibility of high and low INR's. In addition, if warfarin needs to be held for any procedures, please have surgeon or physician's office contact us before holding anticoagulant. Thanks, [facility] Cardiology Coumadin Clinic.

## 2022-11-01 NOTE — PROGRESS NOTES
Pt is above range, will decrease weekly dose and recheck in 2 weeks//KM    Anticoagulation Summary  As of 11/1/2022      INR goal:  2.0-3.0   TTR:  71.0 % (4.9 mo)   INR used for dosing:  3.4 (11/1/2022)   Warfarin maintenance plan:  5 mg (5 mg x 1) every Wed, Sat; 7.5 mg (5 mg x 1.5) all other days; Starting 11/1/2022   Weekly warfarin total:  47.5 mg   Plan last modified:  Francia Neff MA (11/1/2022)   Next INR check:  11/15/2022   Target end date:   Indefinite    Indications    Long term (current) use of anticoagulants [Z79.01]  Atrial fibrillation ([facility] [address] [I48.91]                 Anticoagulation Episode Summary       INR check location:  Anticoagulation Clinic    Preferred lab:      Send INR reminders to:  \[facility]\"" CARDIOLOGY ITA [facility]    Comments:            Anticoagulation Care Providers       Provider Role Specialty Phone number    Blue Owusu MD [facility] Cardiology [phone]

## 2022-11-10 PROCEDURE — 93294 REM INTERROG EVL PM/LDLS PM: CPT | Performed by: INTERNAL MEDICINE

## 2022-11-10 PROCEDURE — 93296 REM INTERROG EVL PM/IDS: CPT | Performed by: INTERNAL MEDICINE

## 2022-11-10 RX ORDER — DILTIAZEM HYDROCHLORIDE 120 MG/1
CAPSULE, COATED, EXTENDED RELEASE ORAL
Qty: 90 CAPSULE | Refills: 3 | Status: SHIPPED | OUTPATIENT
Start: 2022-11-10

## 2022-11-15 ENCOUNTER — ANTI-COAG VISIT (OUTPATIENT)
Dept: CARDIOLOGY CLINIC | Age: 72
End: 2022-11-15
Payer: MEDICARE

## 2022-11-15 DIAGNOSIS — Z79.01 LONG TERM (CURRENT) USE OF ANTICOAGULANTS: Primary | ICD-10-CM

## 2022-11-15 DIAGNOSIS — I48.91 ATRIAL FIBRILLATION (HCC): ICD-10-CM

## 2022-11-15 LAB
POC INR: 2
PROTHROMBIN TIME, POC: YES

## 2022-11-15 PROCEDURE — 85610 PROTHROMBIN TIME: CPT | Performed by: INTERNAL MEDICINE

## 2022-11-15 PROCEDURE — 93793 ANTICOAG MGMT PT WARFARIN: CPT | Performed by: INTERNAL MEDICINE

## 2022-11-15 RX ORDER — CARVEDILOL 25 MG/1
25 TABLET ORAL 2 TIMES DAILY WITH MEALS
Qty: 180 TABLET | Refills: 0 | Status: SHIPPED | OUTPATIENT
Start: 2022-11-15

## 2022-11-15 NOTE — PROGRESS NOTES
Anticoagulation Summary  As of 11/15/2022      INR goal:  2.0-3.0   TTR:  71.0 % (5.4 mo)   INR used for dosin.0 (11/15/2022)   Warfarin maintenance plan:  5 mg (5 mg x 1) every Wed, Sat; 7.5 mg (5 mg x 1.5) all other days; Starting 11/15/2022   Weekly warfarin total:  47.5 mg   Plan last modified:  Marita Cuellar MA (2022)   Next INR check:  2022   Target end date:   Indefinite    Indications    Long term (current) use of anticoagulants [Z79.01]  Atrial fibrillation ([facility] [address] [I48.91]                 Anticoagulation Episode Summary       INR check location:  Anticoagulation Clinic    Preferred lab:      Send INR reminders to:  [facility] CARDIOLOGY ITA PT    Comments:            Anticoagulation Care Providers       Provider Role Specialty Phone number    Quang Wang MD [facility] Cardiology [phone]

## 2022-11-15 NOTE — TELEPHONE ENCOUNTER
Pt was last seen June 2021. Pt is scheduled with Dr. Edd Mccauley January 18th. Per last office note, pt is currently taking carvedilol 25mg BID.

## 2022-11-15 NOTE — PATIENT INSTRUCTIONS
Reminder: Please contact the Coumadin Clinic at [phone]  when you have medication changes. Examples, new medications, antibiotics, discontinued medications, new supplements, missed doses of warfarin or if you took extra doses of warfarin. This also includes OTC medications. Notifying us helps reduce the possibility of high and low INR's. In addition, if warfarin needs to be held for any procedures, please have surgeon or physician's office contact us before holding anticoagulant. Thanks, [facility] Cardiology Coumadin Clinic.

## 2022-11-22 DIAGNOSIS — Z95.0 CARDIAC PACEMAKER: Primary | ICD-10-CM

## 2022-11-22 DIAGNOSIS — I49.5 SSS (SICK SINUS SYNDROME) (HCC): ICD-10-CM

## 2022-11-28 DIAGNOSIS — I49.5 SSS (SICK SINUS SYNDROME) (HCC): ICD-10-CM

## 2022-11-28 DIAGNOSIS — Z95.0 CARDIAC PACEMAKER: ICD-10-CM

## 2022-11-28 DIAGNOSIS — Z95.0 CARDIAC PACEMAKER: Primary | ICD-10-CM

## 2022-11-29 ENCOUNTER — ANTI-COAG VISIT (OUTPATIENT)
Dept: CARDIOLOGY CLINIC | Age: 72
End: 2022-11-29
Payer: MEDICARE

## 2022-11-29 DIAGNOSIS — I48.91 ATRIAL FIBRILLATION (HCC): ICD-10-CM

## 2022-11-29 DIAGNOSIS — Z79.01 LONG TERM (CURRENT) USE OF ANTICOAGULANTS: Primary | ICD-10-CM

## 2022-11-29 LAB
POC INR: 2.3
PROTHROMBIN TIME, POC: YES

## 2022-11-29 PROCEDURE — 85610 PROTHROMBIN TIME: CPT | Performed by: INTERNAL MEDICINE

## 2022-11-29 PROCEDURE — 93793 ANTICOAG MGMT PT WARFARIN: CPT | Performed by: INTERNAL MEDICINE

## 2022-11-29 NOTE — PROGRESS NOTES
Anticoagulation Summary  As of 2022      INR goal:  2.0-3.0   TTR:  73.3 % (5.9 mo)   INR used for dosin.3 (2022)   Warfarin maintenance plan:  5 mg (5 mg x 1) every Wed, Sat; 7.5 mg (5 mg x 1.5) all other days; Starting 2022   Weekly warfarin total:  47.5 mg   Plan last modified:  Geoffrey Post MA (2022)   Next INR check:  2022   Target end date:   Indefinite    Indications    Long term (current) use of anticoagulants [Z79.01]  Atrial fibrillation ([facility] [address] [I48.91]                 Anticoagulation Episode Summary       INR check location:  Anticoagulation Clinic    Preferred lab:      Send INR reminders to:  [facility] CARDIOLOGY ITA PT    Comments:            Anticoagulation Care Providers       Provider Role Specialty Phone number    Madie Goetz MD [facility] Cardiology [phone]

## 2022-11-29 NOTE — PATIENT INSTRUCTIONS
Reminder: Please contact the Coumadin Clinic at [phone]  when you have medication changes. Examples, new medications, antibiotics, discontinued medications, new supplements, missed doses of warfarin or if you took extra doses of warfarin. This also includes OTC medications. Notifying us helps reduce the possibility of high and low INR's. In addition, if warfarin needs to be held for any procedures, please have surgeon or physician's office contact us before holding anticoagulant. Thanks, [facility] Cardiology Coumadin Clinic.

## 2022-12-19 RX ORDER — LOSARTAN POTASSIUM AND HYDROCHLOROTHIAZIDE 25; 100 MG/1; MG/1
1 TABLET ORAL DAILY
Qty: 30 TABLET | Refills: 0 | Status: SHIPPED | OUTPATIENT
Start: 2022-12-19

## 2022-12-19 NOTE — TELEPHONE ENCOUNTER
Pt is scheduled for yearly follow up on 1/18/23. Per last office note, pt is currently taking losartan- HCTZ 100-25 mg.

## 2022-12-19 NOTE — TELEPHONE ENCOUNTER
Patient called stated she needed to get a refill on her losartan hydrochlorothiazide 100-25 mg. Please review and follow up with patient.  Thanks

## 2022-12-27 ENCOUNTER — ANTI-COAG VISIT (OUTPATIENT)
Dept: CARDIOLOGY CLINIC | Age: 72
End: 2022-12-27
Payer: MEDICARE

## 2022-12-27 DIAGNOSIS — Z79.01 LONG TERM (CURRENT) USE OF ANTICOAGULANTS: Primary | ICD-10-CM

## 2022-12-27 DIAGNOSIS — I48.91 ATRIAL FIBRILLATION (HCC): ICD-10-CM

## 2022-12-27 LAB
POC INR: 4.5
PROTHROMBIN TIME, POC: YES

## 2022-12-27 PROCEDURE — 93793 ANTICOAG MGMT PT WARFARIN: CPT | Performed by: INTERNAL MEDICINE

## 2022-12-27 PROCEDURE — 85610 PROTHROMBIN TIME: CPT | Performed by: INTERNAL MEDICINE

## 2022-12-27 NOTE — PROGRESS NOTES
Pt is above range, pt denies any diet or med changes. Will decrease and recheck in 2 weeks//KM    Anticoagulation Summary  As of 2022      INR goal:  2.0-3.0   TTR:  67.6 % (6.8 mo)   INR used for dosin.5 (2022)   Warfarin maintenance plan:  5 mg (5 mg x 1) every Wed, Sat; 7.5 mg (5 mg x 1.5) all other days; Starting 2022   Weekly warfarin total:  47.5 mg   Plan last modified:  Sofia Edward MA (2022)   Next INR check:     Target end date:   Indefinite    Indications    Long term (current) use of anticoagulants [Z79.01]  Atrial fibrillation ([facility] [address] [I48.91]                 Anticoagulation Episode Summary       INR check location:  Anticoagulation Clinic    Preferred lab:      Send INR reminders to:  [facility] CARDIOLOGY ITA PT    Comments:            Anticoagulation Care Providers       Provider Role Specialty Phone number    Girma Velasquez MD [facility] Cardiology [phone]

## 2022-12-27 NOTE — PATIENT INSTRUCTIONS
Reminder: Please contact the Coumadin Clinic at [phone]  when you have medication changes. Examples, new medications, antibiotics, discontinued medications, new supplements, missed doses of warfarin or if you took extra doses of warfarin. This also includes OTC medications. Notifying us helps reduce the possibility of high and low INR's. In addition, if warfarin needs to be held for any procedures, please have surgeon or physician's office contact us before holding anticoagulant. Thanks, [facility] Cardiology Coumadin Clinic.

## 2022-12-29 ENCOUNTER — TELEPHONE (OUTPATIENT)
Dept: CARDIOLOGY CLINIC | Age: 72
End: 2022-12-29

## 2022-12-29 NOTE — TELEPHONE ENCOUNTER
Attempted calling pt, but was unable to leave a voicemail. Pt's medication was sent to the pharmacy on 12/19/22.

## 2022-12-29 NOTE — TELEPHONE ENCOUNTER
MEDICATION REFILL REQUEST      Name of Medication:  losartan-Hydrochlorothiazide  Dose:  100-25 mg  Frequency:  1 a day  Quantity:  30  Days' supply:  30 with refills      Pharmacy Name/Location:  [id number]  Pt is out please call in asa  ,pt said she called in before Risa and asked for refill but nothing was called in

## 2022-12-30 NOTE — TELEPHONE ENCOUNTER
Called and spoke with the pt. Informed her that the medication was sent to [facility] in [address]. Pt states she told us the wrong pharmacy, but will pick it up there.

## 2023-01-10 ENCOUNTER — ANTI-COAG VISIT (OUTPATIENT)
Dept: CARDIOLOGY CLINIC | Age: 73
End: 2023-01-10
Payer: MEDICARE

## 2023-01-10 DIAGNOSIS — I48.91 ATRIAL FIBRILLATION (HCC): ICD-10-CM

## 2023-01-10 DIAGNOSIS — Z79.01 LONG TERM (CURRENT) USE OF ANTICOAGULANTS: Primary | ICD-10-CM

## 2023-01-10 LAB
POC INR: 2.2
PROTHROMBIN TIME, POC: YES

## 2023-01-10 PROCEDURE — 93793 ANTICOAG MGMT PT WARFARIN: CPT | Performed by: INTERNAL MEDICINE

## 2023-01-10 PROCEDURE — 85610 PROTHROMBIN TIME: CPT | Performed by: INTERNAL MEDICINE

## 2023-01-10 NOTE — PROGRESS NOTES
Anticoagulation Summary  As of 1/10/2023      INR goal:  2.0-3.0   TTR:  65.5 % (7.3 mo)   INR used for dosin.2 (1/10/2023)   Warfarin maintenance plan:  5 mg (5 mg x 1) every Wed, Sat; 7.5 mg (5 mg x 1.5) all other days; Starting 1/10/2023   Weekly warfarin total:  47.5 mg   Plan last modified:  Amilcar Wray MA (2022)   Next INR check:  2023   Target end date:   Indefinite    Indications    Long term (current) use of anticoagulants [Z79.01]  Atrial fibrillation ([facility] [address] [I48.91]                 Anticoagulation Episode Summary       INR check location:  Anticoagulation Clinic    Preferred lab:      Send INR reminders to:  [facility] CARDIOLOGY [facility]    Comments:            Anticoagulation Care Providers       Provider Role Specialty Phone number    Ryan Porter MD [facility] Cardiology [phone]

## 2023-01-10 NOTE — PATIENT INSTRUCTIONS
Reminder: Please contact the Coumadin Clinic at [phone]  when you have medication changes. Examples, new medications, antibiotics, discontinued medications, new supplements, missed doses of warfarin or if you took extra doses of warfarin. This also includes OTC medications. Notifying us helps reduce the possibility of high and low INR's. In addition, if warfarin needs to be held for any procedures, please have surgeon or physician's office contact us before holding anticoagulant. Thanks, [facility] Cardiology Coumadin Clinic.

## 2023-01-18 ENCOUNTER — OFFICE VISIT (OUTPATIENT)
Dept: CARDIOLOGY CLINIC | Age: 73
End: 2023-01-18
Payer: MEDICARE

## 2023-01-18 VITALS
HEIGHT: 68 IN | SYSTOLIC BLOOD PRESSURE: 130 MMHG | HEART RATE: 115 BPM | BODY MASS INDEX: 40.16 KG/M2 | WEIGHT: 265 LBS | DIASTOLIC BLOOD PRESSURE: 70 MMHG

## 2023-01-18 DIAGNOSIS — E78.2 MIXED HYPERLIPIDEMIA: ICD-10-CM

## 2023-01-18 DIAGNOSIS — I49.5 SSS (SICK SINUS SYNDROME) (HCC): ICD-10-CM

## 2023-01-18 DIAGNOSIS — I48.21 PERMANENT ATRIAL FIBRILLATION (HCC): Primary | ICD-10-CM

## 2023-01-18 PROCEDURE — 99214 OFFICE O/P EST MOD 30 MIN: CPT | Performed by: INTERNAL MEDICINE

## 2023-01-18 PROCEDURE — 3075F SYST BP GE 130 - 139MM HG: CPT | Performed by: INTERNAL MEDICINE

## 2023-01-18 PROCEDURE — 1123F ACP DISCUSS/DSCN MKR DOCD: CPT | Performed by: INTERNAL MEDICINE

## 2023-01-18 PROCEDURE — G8427 DOCREV CUR MEDS BY ELIG CLIN: HCPCS | Performed by: INTERNAL MEDICINE

## 2023-01-18 PROCEDURE — 3078F DIAST BP <80 MM HG: CPT | Performed by: INTERNAL MEDICINE

## 2023-01-18 PROCEDURE — 93000 ELECTROCARDIOGRAM COMPLETE: CPT | Performed by: INTERNAL MEDICINE

## 2023-01-18 PROCEDURE — 1090F PRES/ABSN URINE INCON ASSESS: CPT | Performed by: INTERNAL MEDICINE

## 2023-01-18 PROCEDURE — G8400 PT W/DXA NO RESULTS DOC: HCPCS | Performed by: INTERNAL MEDICINE

## 2023-01-18 PROCEDURE — G8417 CALC BMI ABV UP PARAM F/U: HCPCS | Performed by: INTERNAL MEDICINE

## 2023-01-18 PROCEDURE — 3017F COLORECTAL CA SCREEN DOC REV: CPT | Performed by: INTERNAL MEDICINE

## 2023-01-18 PROCEDURE — 4004F PT TOBACCO SCREEN RCVD TLK: CPT | Performed by: INTERNAL MEDICINE

## 2023-01-18 PROCEDURE — G8484 FLU IMMUNIZE NO ADMIN: HCPCS | Performed by: INTERNAL MEDICINE

## 2023-01-18 RX ORDER — CARVEDILOL 25 MG/1
25 TABLET ORAL 2 TIMES DAILY WITH MEALS
Qty: 180 TABLET | Refills: 3 | Status: SHIPPED | OUTPATIENT
Start: 2023-01-18

## 2023-01-18 RX ORDER — LATANOPROST 50 UG/ML
1 SOLUTION/ DROPS OPHTHALMIC NIGHTLY
COMMUNITY

## 2023-01-18 RX ORDER — PRAVASTATIN SODIUM 40 MG
40 TABLET ORAL DAILY
Qty: 90 TABLET | Refills: 3 | Status: SHIPPED | OUTPATIENT
Start: 2023-01-18

## 2023-01-18 RX ORDER — LOSARTAN POTASSIUM AND HYDROCHLOROTHIAZIDE 25; 100 MG/1; MG/1
1 TABLET ORAL DAILY
Qty: 90 TABLET | Refills: 3 | Status: SHIPPED | OUTPATIENT
Start: 2023-01-18

## 2023-01-18 RX ORDER — ORAL SEMAGLUTIDE 7 MG/1
TABLET ORAL
COMMUNITY

## 2023-01-18 RX ORDER — DILTIAZEM HYDROCHLORIDE 240 MG/1
CAPSULE, COATED, EXTENDED RELEASE ORAL
Qty: 90 CAPSULE | Refills: 3 | Status: SHIPPED | OUTPATIENT
Start: 2023-01-18

## 2023-01-18 ASSESSMENT — ENCOUNTER SYMPTOMS
COUGH: 0
BLOATING: 0
SHORTNESS OF BREATH: 0
VOMITING: 0
HEMOPTYSIS: 0
ABDOMINAL PAIN: 0
BLURRED VISION: 0
DOUBLE VISION: 0
BACK PAIN: 0
NAUSEA: 0
ORTHOPNEA: 0

## 2023-01-18 NOTE — PROGRESS NOTES
[facility] CARDIOLOGY  [address], [address]  [place], [address] [place] [address]  PHONE: [phone]    23    NAME:  Bailee Mack  : 1950  MRN: [id number]         SUBJECTIVE:   Bailee Mack is a 71 y.o. female seen for a visit regarding the following:     Chief Complaint   Patient presents with    Atrial Fibrillation    Hyperlipidemia    Other     Rates        HPI:      Prior hx of permanent A. Fib on coumadin. Per history, prior failed DCCV and anti-arrhythmics (she is unsure of which one) and  lower ext edema  noted with diltiazem. Also history of DM, HTN, SSS s/p PM (; <1% V pacing). Echo with preserved EF and mod to sev LAE (; mild MR). Also JOE on CPAP. Prior Holter with average heart rate of 96 [; since added cardizem as well]. Negative Cardiolite stress test from 10/18. Echocardiogram reviewed from 2021 with preserved EF and mild mitral regurgitation. Underwent generator exchange from 2021     Doing well since last visit. No recurrent palpitations. Tolerating anticoagulation. Some elevated heart rates noted on device check. Underwent rt knee replacement (). No CP; still some impeded by knee pain. Much improved HRs since starting low dose cardizem previously. Stable fatigue     Prior--Some prior issues with fatigue; +snoring-set up with sleep study and sev JOE; much improved fatigue on CPAP. Perm afib-controlled, HTN-controlled, SSS-controlled, JOE-controlled, fatigue-controlled     Prior nuclear stress in  unremarkable, and echo with preserved LV function with mod MR and LA size. Past Medical History, Past Surgical History, Family history, Social History, and Medications were all reviewed with the patient today and updated as necessary.      Allergies   Allergen Reactions    Ciprofloxacin Other (See Comments)     hyperactivity    Penicillins Other (See Comments)     As a child    Sulfa Antibiotics Rash     Patient Active Problem List   Diagnosis    Sleep apnea    SSS (sick sinus syndrome) (HCC)    Mixed hyperlipidemia    Cardiac pacemaker    Mitral valve regurgitation    Long term (current) use of anticoagulants    Morbid obesity (HCC)    GERD (gastroesophageal reflux disease)    Essential hypertension    Atrial fibrillation (HCC)    Arrhythmia    Endometrial hyperplasia with atypia    Arthritis    Neuropathy     Past Medical History:   Diagnosis Date    Arrhythmia     a-fib, pacemaker /  allan-tachy at timesprior to pacemaker    Arthritis     self diagnosed in knees    Atrial fibrillation ([address] 10/28/2011    Cardiac pacemaker     Diabetes ([address]     type 2;  BS , oral med. A1c  6.1     Endometrial hyperplasia     Endometrial hyperplasia with atypia 10/28/2011    GERD (gastroesophageal reflux disease)     nexium daily    Hypertension     Mitral valve regurgitation     Mixed hyperlipidemia     Morbid obesity ([address]     Neuropathy     feet    Overweight(278.02) 10/28/2011    SSS (sick sinus syndrome) ([facility])     Type II or unspecified type diabetes mellitus without mention of complication, not stated as uncontrolled 10/28/2011    Unspecified essential hypertension 10/28/2011     Past Surgical History:   Procedure Laterality Date    GYN      D&C x2    HEENT      right eye corneal transplant and cat.      OTHER SURGICAL HISTORY      pilonial cyst    PACEMAKER      '09    AL ABDOMEN SURGERY PROC UNLISTED      mayte     Family History   Problem Relation Age of Onset    Heart Disease Mother     Diabetes Father     Stroke Father     Heart Disease Father     Cancer Mother     Hypertension Mother      Social History     Tobacco Use    Smoking status: Never    Smokeless tobacco: Never   Substance Use Topics    Alcohol use: Yes     Current Outpatient Medications   Medication Sig Dispense Refill    Semaglutide (RYBELSUS) 7 MG TABS Take by mouth      Flaxseed, Linseed, (FLAXSEED OIL) 1200 MG CAPS Take by mouth      latanoprost (XALATAN) 0.005 % ophthalmic solution 1 drop nightly      losartan-hydroCHLOROthiazide (HYZAAR) 100-25 MG per tablet Take 1 tablet by mouth daily 90 tablet 3    dilTIAZem (CARDIZEM CD) 240 MG extended release capsule TAKE 1 CAPSULE BY MOUTH DAILY 90 capsule 3    carvedilol (COREG) 25 MG tablet Take 1 tablet by mouth 2 times daily (with meals) 180 tablet 3    pravastatin (PRAVACHOL) 40 MG tablet Take 1 tablet by mouth daily TAKE 1 TABLET BY MOUTH EVERY NIGHT 90 tablet 3    warfarin (COUMADIN) 5 MG tablet Take 1.5 tablets daily or as directed 135 tablet 3    vitamin D3 (CHOLECALCIFEROL) 125 MCG (5000 UT) TABS tablet Take by mouth daily      DULoxetine (CYMBALTA) 60 MG extended release capsule Take 60 mg by mouth daily      esomeprazole (NEXIUM) 40 MG delayed release capsule Take 40 mg by mouth daily      melatonin 3 MG TABS tablet Take 3 mg by mouth      metFORMIN (GLUCOPHAGE) 500 MG tablet Take by mouth 3 times daily (with meals)      mirabegron (MYRBETRIQ) 25 MG TB24 Take 25 mg by mouth daily      SITagliptin (JANUVIA) 100 MG tablet Take 50 mg by mouth daily      clindamycin (CLEOCIN) 150 MG capsule Take 150 mg by mouth (Patient not taking: Reported on 1/18/2023)      dilTIAZem (TIAZAC) 120 MG extended release capsule TAKE 1 CAPSULE BY MOUTH DAILY       No current facility-administered medications for this visit. Review of Systems   Constitutional: Positive for malaise/fatigue (improved). Negative for chills, decreased appetite, fever and weight gain. HENT:  Negative for nosebleeds. Eyes:  Negative for blurred vision and double vision. Cardiovascular:  Negative for chest pain, claudication, dyspnea on exertion, leg swelling, orthopnea, palpitations, paroxysmal nocturnal dyspnea and syncope. Respiratory:  Negative for cough, hemoptysis and shortness of breath. Endocrine: Negative for cold intolerance and heat intolerance. Hematologic/Lymphatic: Negative for bleeding problem. Skin:  Negative for rash. Musculoskeletal:  Negative for back pain, joint pain, muscle weakness and myalgias. Gastrointestinal:  Negative for bloating, abdominal pain, nausea and vomiting. Genitourinary:  Negative for dysuria. Neurological:  Negative for dizziness, light-headedness and weakness. Psychiatric/Behavioral:  Negative for altered mental status. PHYSICAL EXAM:    /70   Pulse (!) 115   Ht 5' 8\" (1.727 m)   Wt 265 lb (120.2 kg)   BMI 40.29 kg/m²      Physical Exam  Constitutional:       Appearance: Normal appearance. HENT:      Head: Normocephalic and atraumatic. Mouth/Throat:      Mouth: Mucous membranes are moist.   Eyes:      Pupils: Pupils are equal, round, and reactive to light. Neck:      Vascular: No carotid bruit. Cardiovascular:      Rate and Rhythm: Normal rate. Rhythm irregular. Pulses: Normal pulses. Heart sounds: No murmur heard. Pulmonary:      Effort: Pulmonary effort is normal.      Breath sounds: Normal breath sounds. Abdominal:      General: There is no distension. Palpations: Abdomen is soft. Tenderness: There is no abdominal tenderness. Musculoskeletal:         General: No swelling. Cervical back: Normal range of motion. Skin:     General: Skin is warm and dry. Neurological:      General: No focal deficit present. Mental Status: She is alert. Psychiatric:         Mood and Affect: Mood normal.       Medical problems and test results were reviewed with the patient today.      Recent Results (from the past 672 hour(s))   PT/INR (Resulted at [facility]/in-office AND billed by D)    Collection Time: 12/27/22 10:43 AM   Result Value Ref Range    Prothrombin time, POC yes     POC INR 4.5    PT/INR (Resulted at [facility]/in-office AND billed by D)    Collection Time: 01/10/23 10:42 AM   Result Value Ref Range    Prothrombin time, POC yes     POC INR 2.2      No results found for: CHOL, CHOLPOCT, CHOLX, CHLST, CHOLV, HDL, HDLPOC, HDLC, LDL, LDLC, VLDLC, VLDL, TGLX, TRIGL    No results found for any visits on 01/18/23. ASSESSMENT and PLAN    Fernandez Ayala was seen today for atrial fibrillation, hyperlipidemia and other. Diagnoses and all orders for this visit:    Permanent atrial fibrillation ([address]  -     EKG 12 lead    SSS (sick sinus syndrome) (HCC)  -     EKG 12 lead    Mixed hyperlipidemia  -     pravastatin (PRAVACHOL) 40 MG tablet; Take 1 tablet by mouth daily TAKE 1 TABLET BY MOUTH EVERY NIGHT    Other orders  -     losartan-hydroCHLOROthiazide (HYZAAR) 100-25 MG per tablet; Take 1 tablet by mouth daily  -     dilTIAZem (CARDIZEM CD) 240 MG extended release capsule; TAKE 1 CAPSULE BY MOUTH DAILY  -     carvedilol (COREG) 25 MG tablet; Take 1 tablet by mouth 2 times daily (with meals)      Overall Impression    HLP: On a mod intensity statin     HTN: controlled; appears lower blood pressures and better control since sleep apnea treated. Stopped Norvasc prior. Monitor home logs. Target less than 130/80     Permanent afib: Improved rate control on low-dose Cardizem; some intermittent increased rate including on device check increase Cardizem to 240 mg daily. Prior some issues with edema and monitor closely. Coumadin clinic. Discussed consideration for NOAC; appears prior issues with GERD with Pradaxa and consideration for Eliquis; wants to hold off. Long-term use of anticoagulation-risk/benefits/alternatives discussed with patient. Continue Coumadin. PPM: controlled; last check reviewed with adequate functioning; <6% ventricular pacing. MR: controlled. Mild per last echocardiogram in 4/2021. Plan repeat in 3 to 5 years or sooner if symptoms     Fatigue: much improved with CPAP     JOE: stressed compliance       Return in about 1 year (around 1/18/2024).      Truong Knowles MD  1/18/2023  9:59 AM

## 2023-01-24 ENCOUNTER — ANTI-COAG VISIT (OUTPATIENT)
Dept: CARDIOLOGY CLINIC | Age: 73
End: 2023-01-24
Payer: MEDICARE

## 2023-01-24 DIAGNOSIS — I48.91 ATRIAL FIBRILLATION (HCC): ICD-10-CM

## 2023-01-24 DIAGNOSIS — Z79.01 LONG TERM (CURRENT) USE OF ANTICOAGULANTS: Primary | ICD-10-CM

## 2023-01-24 LAB
POC INR: 2.6
PROTHROMBIN TIME, POC: YES

## 2023-01-24 PROCEDURE — 93793 ANTICOAG MGMT PT WARFARIN: CPT | Performed by: INTERNAL MEDICINE

## 2023-01-24 PROCEDURE — 85610 PROTHROMBIN TIME: CPT | Performed by: INTERNAL MEDICINE

## 2023-01-24 NOTE — PROGRESS NOTES
Anticoagulation Summary  As of 2023      INR goal:  2.0-3.0   TTR:  67.6 % (7.7 mo)   INR used for dosin.6 (2023)   Warfarin maintenance plan:  5 mg (5 mg x 1) every Wed, Sat; 7.5 mg (5 mg x 1.5) all other days; Starting 2023   Weekly warfarin total:  47.5 mg   Plan last modified:  Arlen Otoole MA (2022)   Next INR check:  2023   Target end date:   Indefinite    Indications    Long term (current) use of anticoagulants [Z79.01]  Atrial fibrillation ([facility] [address] [I48.91]                 Anticoagulation Episode Summary       INR check location:  Anticoagulation Clinic    Preferred lab:      Send INR reminders to:  L [facility] CARDIOLOGY ITA PT    Comments:            Anticoagulation Care Providers       Provider Role Specialty Phone number    Alexei Castañeda MD [facility] Cardiology [phone]

## 2023-01-24 NOTE — PATIENT INSTRUCTIONS
Reminder: Please contact the Coumadin Clinic at [phone]  when you have medication changes. Examples, new medications, antibiotics, discontinued medications, new supplements, missed doses of warfarin or if you took extra doses of warfarin. This also includes OTC medications. Notifying us helps reduce the possibility of high and low INR's. In addition, if warfarin needs to be held for any procedures, please have surgeon or physician's office contact us before holding anticoagulant. Thanks, [facility] Cardiology Coumadin Clinic.

## 2023-02-15 PROCEDURE — 93294 REM INTERROG EVL PM/LDLS PM: CPT | Performed by: INTERNAL MEDICINE

## 2023-02-15 PROCEDURE — 93296 REM INTERROG EVL PM/IDS: CPT | Performed by: INTERNAL MEDICINE

## 2023-02-21 ENCOUNTER — ANTI-COAG VISIT (OUTPATIENT)
Dept: CARDIOLOGY CLINIC | Age: 73
End: 2023-02-21
Payer: MEDICARE

## 2023-02-21 DIAGNOSIS — I48.91 ATRIAL FIBRILLATION (HCC): ICD-10-CM

## 2023-02-21 DIAGNOSIS — Z79.01 LONG TERM (CURRENT) USE OF ANTICOAGULANTS: Primary | ICD-10-CM

## 2023-02-21 LAB
POC INR: 2.6
PROTHROMBIN TIME, POC: NORMAL

## 2023-02-21 PROCEDURE — 93793 ANTICOAG MGMT PT WARFARIN: CPT | Performed by: INTERNAL MEDICINE

## 2023-02-21 PROCEDURE — 85610 PROTHROMBIN TIME: CPT | Performed by: INTERNAL MEDICINE

## 2023-02-21 NOTE — PATIENT INSTRUCTIONS
Patient called to request bloodwork order so she didn't have to come in twice . Orders already in and scheduled her for 8/6 . She is also complaining of worsening symptoms of sinus burning has now developed a headache , nausea, fatigue and is sleeping a lot . Patient is concerned for infection denies any nasal congestion or colored mucus . Reminder: Please contact the Coumadin Clinic at [phone]  when you have medication changes. Examples, new medications, antibiotics, discontinued medications, new supplements, missed doses of warfarin or if you took extra doses of warfarin. This also includes OTC medications. Notifying us helps reduce the possibility of high and low INR's. In addition, if warfarin needs to be held for any procedures, please have surgeon or physician's office contact us before holding anticoagulant. Thanks, [facility] Cardiology Coumadin Clinic.

## 2023-02-21 NOTE — PROGRESS NOTES
Anticoagulation Summary  As of 2023      INR goal:  2.0-3.0   TTR:  71.1 % (8.7 mo)   INR used for dosin.6 (2023)   Warfarin maintenance plan:  5 mg (5 mg x 1) every Wed, Sat; 7.5 mg (5 mg x 1.5) all other days; Starting 2023   Weekly warfarin total:  47.5 mg   Plan last modified:  Eber Vora MA (2022)   Next INR check:  3/21/2023   Target end date:   Indefinite    Indications    Long term (current) use of anticoagulants [Z79.01]  Atrial fibrillation ([facility] [address] [I48.91]                 Anticoagulation Episode Summary       INR check location:  Anticoagulation Clinic    Preferred lab:      Send INR reminders to:  L [facility] CARDIOLOGY ITA PT    Comments:            Anticoagulation Care Providers       Provider Role Specialty Phone number    Stan Soto MD [facility] Cardiology [phone]

## 2023-03-09 DIAGNOSIS — I49.5 SSS (SICK SINUS SYNDROME) (HCC): ICD-10-CM

## 2023-03-09 DIAGNOSIS — Z95.0 CARDIAC PACEMAKER: ICD-10-CM

## 2023-03-21 ENCOUNTER — ANTI-COAG VISIT (OUTPATIENT)
Dept: CARDIOLOGY CLINIC | Age: 73
End: 2023-03-21
Payer: MEDICARE

## 2023-03-21 DIAGNOSIS — I48.91 ATRIAL FIBRILLATION (HCC): ICD-10-CM

## 2023-03-21 DIAGNOSIS — Z79.01 LONG TERM (CURRENT) USE OF ANTICOAGULANTS: Primary | ICD-10-CM

## 2023-03-21 LAB
POC INR: 2.3
PROTHROMBIN TIME, POC: YES

## 2023-03-21 PROCEDURE — 93793 ANTICOAG MGMT PT WARFARIN: CPT | Performed by: INTERNAL MEDICINE

## 2023-03-21 PROCEDURE — 85610 PROTHROMBIN TIME: CPT | Performed by: INTERNAL MEDICINE

## 2023-03-21 NOTE — PATIENT INSTRUCTIONS
Reminder: Please contact the Coumadin Clinic at [phone]  when you have medication changes. Examples, new medications, antibiotics, discontinued medications, new supplements, missed doses of warfarin or if you took extra doses of warfarin. This also includes OTC medications. Notifying us helps reduce the possibility of high and low INR's. In addition, if warfarin needs to be held for any procedures, please have surgeon or physician's office contact us before holding anticoagulant. Thanks, [facility] Cardiology Coumadin Clinic.

## 2023-03-21 NOTE — PROGRESS NOTES
Anticoagulation Summary  As of 3/21/2023      INR goal:  2.0-3.0   TTR:  73.9 % (9.6 mo)   INR used for dosin.3 (3/21/2023)   Warfarin maintenance plan:  5 mg (5 mg x 1) every Wed, Sat; 7.5 mg (5 mg x 1.5) all other days; Starting 3/21/2023   Weekly warfarin total:  47.5 mg   Plan last modified:  Elizabeth Chandra MA (2022)   Next INR check:  2023   Target end date:   Indefinite    Indications    Long term (current) use of anticoagulants [Z79.01]  Atrial fibrillation ([facility] [address] [I48.91]                 Anticoagulation Episode Summary       INR check location:  Anticoagulation Clinic    Preferred lab:      Send INR reminders to:  L [facility] CARDIOLOGY ITA PT    Comments:            Anticoagulation Care Providers       Provider Role Specialty Phone number    Yina Webster MD [facility] Cardiology [phone]

## 2023-04-18 ENCOUNTER — ANTI-COAG VISIT (OUTPATIENT)
Dept: CARDIOLOGY CLINIC | Age: 73
End: 2023-04-18
Payer: MEDICARE

## 2023-04-18 DIAGNOSIS — I48.91 ATRIAL FIBRILLATION (HCC): ICD-10-CM

## 2023-04-18 DIAGNOSIS — Z79.01 LONG TERM (CURRENT) USE OF ANTICOAGULANTS: Primary | ICD-10-CM

## 2023-04-18 LAB
POC INR: 2
PROTHROMBIN TIME, POC: NORMAL

## 2023-04-18 PROCEDURE — 85610 PROTHROMBIN TIME: CPT | Performed by: INTERNAL MEDICINE

## 2023-04-18 PROCEDURE — 93793 ANTICOAG MGMT PT WARFARIN: CPT | Performed by: INTERNAL MEDICINE

## 2023-04-18 NOTE — PATIENT INSTRUCTIONS
Reminder: Please contact the [facility]madin Clinic at [phone]  when you have medication changes. Examples, new medications, antibiotics, discontinued medications, new supplements, missed doses of warfarin or if you took extra doses of warfarin. This also includes OTC medications. Notifying us helps reduce the possibility of high and low INR's. In addition, if warfarin needs to be held for any procedures, please have surgeon or physician's office contact us before holding anticoagulant. Thanks, [facility] Cardiology Coumadin Clinic.

## 2023-04-18 NOTE — PROGRESS NOTES
Anticoagulation Summary  As of 2023      INR goal:  2.0-3.0   TTR:  76.2 % (10.5 mo)   INR used for dosin.0 (2023)   Warfarin maintenance plan:  5 mg (5 mg x 1) every Wed, Sat; 7.5 mg (5 mg x 1.5) all other days   Weekly warfarin total:  47.5 mg   Plan last modified:  Ana Rosa Bob MA (2022)   Next INR check:  2023   Target end date:   Indefinite    Indications    Long term (current) use of anticoagulants [Z79.01]  Atrial fibrillation ([facility] [address] [I48.91]                 Anticoagulation Episode Summary       INR check location:  Anticoagulation Clinic    Preferred lab:      Send INR reminders to:  \[facility]\"" CARDIOLOGY ITA PT    Comments:            Anticoagulation Care Providers       Provider Role Specialty Phone number    Vasquez Naylor MD [facility] Cardiology [phone] NPO x 1 day

## 2023-05-16 ENCOUNTER — ANTI-COAG VISIT (OUTPATIENT)
Age: 73
End: 2023-05-16

## 2023-05-16 DIAGNOSIS — Z79.01 LONG TERM (CURRENT) USE OF ANTICOAGULANTS: Primary | ICD-10-CM

## 2023-05-16 DIAGNOSIS — I48.91 ATRIAL FIBRILLATION (HCC): ICD-10-CM

## 2023-05-16 LAB
POC INR: 2.2
PROTHROMBIN TIME, POC: NORMAL

## 2023-05-16 NOTE — PROGRESS NOTES
Anticoagulation Summary  As of 2023      INR goal:  2.0-3.0   TTR:  78.1 % (11.5 mo)   INR used for dosin.2 (2023)   Warfarin maintenance plan:  5 mg (5 mg x 1) every Wed, Sat; 7.5 mg (5 mg x 1.5) all other days   Weekly warfarin total:  47.5 mg   Plan last modified:  Cheyenne Hendrix MA (2022)   Next INR check:  2023   Target end date:   Indefinite    Indications    Long term (current) use of anticoagulants [Z79.01]  Atrial fibrillation ([facility] [address] [I48.91]                 Anticoagulation Episode Summary       INR check location:  Anticoagulation Clinic    Preferred lab:      Send INR reminders to:  [facility] CARDIOLOGY ITA PT    Comments:            Anticoagulation Care Providers       Provider Role Specialty Phone number    Faye Velasquez MD [facility] Cardiology [phone]

## 2023-05-16 NOTE — PATIENT INSTRUCTIONS
Reminder: Please contact the Coumadin Clinic at [phone]  when you have medication changes. Examples, new medications, antibiotics, discontinued medications, new supplements, missed doses of warfarin or if you took extra doses of warfarin. This also includes OTC medications. Notifying us helps reduce the possibility of high and low INR's. In addition, if warfarin needs to be held for any procedures, please have surgeon or physician's office contact us before holding anticoagulant. Thanks, [facility] Cardiology Coumadin Clinic. 12-May-2018 14:39

## 2023-07-11 ENCOUNTER — ANTI-COAG VISIT (OUTPATIENT)
Age: 73
End: 2023-07-11
Payer: MEDICARE

## 2023-07-11 DIAGNOSIS — Z79.01 LONG TERM (CURRENT) USE OF ANTICOAGULANTS: Primary | ICD-10-CM

## 2023-07-11 DIAGNOSIS — I48.91 ATRIAL FIBRILLATION (HCC): ICD-10-CM

## 2023-07-11 LAB
POC INR: 2.9
PROTHROMBIN TIME, POC: NORMAL

## 2023-07-11 PROCEDURE — 85610 PROTHROMBIN TIME: CPT | Performed by: INTERNAL MEDICINE

## 2023-07-11 PROCEDURE — 93793 ANTICOAG MGMT PT WARFARIN: CPT | Performed by: INTERNAL MEDICINE

## 2023-07-11 NOTE — PROGRESS NOTES
Anticoagulation Summary  As of 2023      INR goal:  2.0-3.0   TTR:  81.2 % (1.1 y)   INR used for dosin.9 (2023)   Warfarin maintenance plan:  5 mg (5 mg x 1) every Wed, Sat; 7.5 mg (5 mg x 1.5) all other days   Weekly warfarin total:  47.5 mg   Plan last modified:  Jim Leigh MA (2022)   Next INR check:  2023   Target end date:   Indefinite    Indications    Long term (current) use of anticoagulants [Z79.01]  Atrial fibrillation ([address]) [I48.91]                 Anticoagulation Episode Summary       INR check location:  Anticoagulation Clinic    Preferred lab:      Send INR reminders to:  \[facility]\"" CARDIOLOGY ITA PT    Comments:            Anticoagulation Care Providers       Provider Role Specialty Phone number    Leonila Mayen MD [facility] Cardiology [phone]

## 2023-07-11 NOTE — PATIENT INSTRUCTIONS
Reminder: Please contact the Coumadin Clinic at [phone]  when you have medication changes. Examples, new medications, antibiotics, discontinued medications, new supplements, missed doses of warfarin or if you took extra doses of warfarin. This also includes OTC medications. Notifying us helps reduce the possibility of high and low INR's. In addition, if warfarin needs to be held for any procedures, please have surgeon or physician's office contact us before holding anticoagulant. Thanks, [facility] Cardiology Coumadin Clinic.

## 2023-07-20 DIAGNOSIS — I49.5 SSS (SICK SINUS SYNDROME) (HCC): ICD-10-CM

## 2023-07-20 DIAGNOSIS — Z95.0 CARDIAC PACEMAKER: ICD-10-CM

## 2023-08-01 PROCEDURE — 93294 REM INTERROG EVL PM/LDLS PM: CPT | Performed by: INTERNAL MEDICINE

## 2023-08-01 PROCEDURE — 93296 REM INTERROG EVL PM/IDS: CPT | Performed by: INTERNAL MEDICINE

## 2023-08-08 ENCOUNTER — ANTI-COAG VISIT (OUTPATIENT)
Age: 73
End: 2023-08-08
Payer: MEDICARE

## 2023-08-08 DIAGNOSIS — I48.91 ATRIAL FIBRILLATION (HCC): ICD-10-CM

## 2023-08-08 DIAGNOSIS — Z79.01 LONG TERM (CURRENT) USE OF ANTICOAGULANTS: Primary | ICD-10-CM

## 2023-08-08 LAB
POC INR: 1.8
PROTHROMBIN TIME, POC: YES

## 2023-08-08 PROCEDURE — 85610 PROTHROMBIN TIME: CPT | Performed by: INTERNAL MEDICINE

## 2023-08-08 PROCEDURE — 93793 ANTICOAG MGMT PT WARFARIN: CPT | Performed by: INTERNAL MEDICINE

## 2023-08-08 NOTE — PATIENT INSTRUCTIONS
Reminder: Please contact the Coumadin Clinic at [phone]  when you have medication changes. Examples, new medications, antibiotics, discontinued medications, new supplements, missed doses of warfarin or if you took extra doses of warfarin. This also includes OTC medications. Notifying us helps reduce the possibility of high and low INR's. In addition, if warfarin needs to be held for any procedures, please have surgeon or physician's office contact us before holding anticoagulant. Thanks, [facility] Cardiology Coumadin Clinic.

## 2023-08-08 NOTE — PROGRESS NOTES
Anticoagulation Summary  As of 2023      INR goal:  2.0-3.0   TTR:  81.2 % (1.2 y)   INR used for dosin.8 (2023)   Warfarin maintenance plan:  5 mg (5 mg x 1) every Wed, Sat; 7.5 mg (5 mg x 1.5) all other days   Weekly warfarin total:  47.5 mg   Plan last modified:  Jim Leigh MA (2022)   Next INR check:  2023   Target end date:   Indefinite    Indications    Long term (current) use of anticoagulants [Z79.01]  Atrial fibrillation ([address]) [I48.91]                 Anticoagulation Episode Summary       INR check location:  Anticoagulation Clinic    Preferred lab:      Send INR reminders to:  [facility] CARDIOLOGY ITA PT    Comments:            Anticoagulation Care Providers       Provider Role Specialty Phone number    Leonila Mayen MD [facility] Cardiology [phone]

## 2023-08-22 ENCOUNTER — ANTI-COAG VISIT (OUTPATIENT)
Age: 73
End: 2023-08-22
Payer: MEDICARE

## 2023-08-22 DIAGNOSIS — I48.91 ATRIAL FIBRILLATION (HCC): ICD-10-CM

## 2023-08-22 DIAGNOSIS — Z79.01 LONG TERM (CURRENT) USE OF ANTICOAGULANTS: Primary | ICD-10-CM

## 2023-08-22 LAB
POC INR: 2.9
PROTHROMBIN TIME, POC: YES

## 2023-08-22 PROCEDURE — 85610 PROTHROMBIN TIME: CPT | Performed by: INTERNAL MEDICINE

## 2023-08-22 PROCEDURE — 93793 ANTICOAG MGMT PT WARFARIN: CPT | Performed by: INTERNAL MEDICINE

## 2023-08-22 NOTE — PROGRESS NOTES
Anticoagulation Summary  As of 2023      INR goal:  2.0-3.0   TTR:  81.3 % (1.2 y)   INR used for dosin.9 (2023)   Warfarin maintenance plan:  5 mg (5 mg x 1) every Wed, Sat; 7.5 mg (5 mg x 1.5) all other days   Weekly warfarin total:  47.5 mg   Plan last modified:  Sabrina Alvarez MA (2022)   Next INR check:  2023   Target end date:   Indefinite    Indications    Long term (current) use of anticoagulants [Z79.01]  Atrial fibrillation ([address]) [I48.91]                 Anticoagulation Episode Summary       INR check location:  Anticoagulation Clinic    Preferred lab:      Send INR reminders to:  \[facility]\"" CARDIOLOGY ITA PT    Comments:            Anticoagulation Care Providers       Provider Role Specialty Phone number    Darren Heck MD [facility] Cardiology [phone]

## 2023-08-22 NOTE — PATIENT INSTRUCTIONS
Reminder: Please contact the Coumadin Clinic at [phone]  when you have medication changes. Examples, new medications, antibiotics, discontinued medications, new supplements, missed doses of warfarin or if you took extra doses of warfarin. This also includes OTC medications. Notifying us helps reduce the possibility of high and low INR's. In addition, if warfarin needs to be held for any procedures, please have surgeon or physician's office contact us before holding anticoagulant. Thanks, [facility] Cardiology Coumadin Clinic.

## 2023-09-06 ENCOUNTER — ANTI-COAG VISIT (OUTPATIENT)
Age: 73
End: 2023-09-06

## 2023-09-06 DIAGNOSIS — Z79.01 LONG TERM (CURRENT) USE OF ANTICOAGULANTS: Primary | ICD-10-CM

## 2023-09-06 DIAGNOSIS — I48.91 ATRIAL FIBRILLATION (HCC): ICD-10-CM

## 2023-09-06 LAB
POC INR: 2.6
PROTHROMBIN TIME, POC: NORMAL

## 2023-09-06 NOTE — PROGRESS NOTES
Anticoagulation Summary  As of 2023      INR goal:  2.0-3.0   TTR:  81.9 % (1.3 y)   INR used for dosin.6 (2023)   Warfarin maintenance plan:  5 mg (5 mg x 1) every Wed, Sat; 7.5 mg (5 mg x 1.5) all other days   Weekly warfarin total:  47.5 mg   Plan last modified:  Lucai Pennington MA (2022)   Next INR check:  10/4/2023   Target end date:   Indefinite    Indications    Long term (current) use of anticoagulants [Z79.01]  Atrial fibrillation ([address]) [I48.91]                 Anticoagulation Episode Summary       INR check location:  Anticoagulation Clinic    Preferred lab:      Send INR reminders to:  [facility] CARDIOLOGY ITA PT    Comments:            Anticoagulation Care Providers       Provider Role Specialty Phone number    Klaudia Zamudio MD [facility] Cardiology [phone]

## 2023-09-06 NOTE — PATIENT INSTRUCTIONS
Reminder: Please contact the Coumadin Clinic at [phone]  when you have medication changes. Examples, new medications, antibiotics, discontinued medications, new supplements, missed doses of warfarin or if you took extra doses of warfarin. This also includes OTC medications. Notifying us helps reduce the possibility of high and low INR's. In addition, if warfarin needs to be held for any procedures, please have surgeon or physician's office contact us before holding anticoagulant. Thanks, [facility] Cardiology Coumadin Clinic.

## 2023-10-04 ENCOUNTER — ANTI-COAG VISIT (OUTPATIENT)
Age: 73
End: 2023-10-04
Payer: MEDICARE

## 2023-10-04 DIAGNOSIS — Z79.01 LONG TERM (CURRENT) USE OF ANTICOAGULANTS: Primary | ICD-10-CM

## 2023-10-04 DIAGNOSIS — I48.91 ATRIAL FIBRILLATION (HCC): ICD-10-CM

## 2023-10-04 LAB
POC INR: 1.4
PROTHROMBIN TIME, POC: NORMAL

## 2023-10-04 PROCEDURE — 93793 ANTICOAG MGMT PT WARFARIN: CPT | Performed by: INTERNAL MEDICINE

## 2023-10-04 PROCEDURE — 85610 PROTHROMBIN TIME: CPT | Performed by: INTERNAL MEDICINE

## 2023-10-04 NOTE — PATIENT INSTRUCTIONS
Reminder: Please contact the Coumadin Clinic at [phone]  when you have medication changes. Examples, new medications, antibiotics, discontinued medications, new supplements, missed doses of warfarin or if you took extra doses of warfarin. This also includes OTC medications. Notifying us helps reduce the possibility of high and low INR's. In addition, if warfarin needs to be held for any procedures, please have surgeon or physician's office contact us before holding anticoagulant. Thanks, [facility] Cardiology Coumadin Clinic.

## 2023-10-04 NOTE — PROGRESS NOTES
Pt is below range, Pt states it is possible she missed a day. Will increase and recheck in 2 weeks. Lamine AGGARWAL  Anticoagulation Summary  As of 10/4/2023      INR goal:  2.0-3.0   TTR:  80.0 % (1.3 y)   INR used for dosin.4 (10/4/2023)   Warfarin maintenance plan:  5 mg (5 mg x 1) every Wed, Sat; 7.5 mg (5 mg x 1.5) all other days   Weekly warfarin total:  47.5 mg   Plan last modified:  Karl Carson MA (2022)   Next INR check:  10/18/2023   Target end date:   Indefinite    Indications    Long term (current) use of anticoagulants [Z79.01]  Atrial fibrillation ([address]) [I48.91]                 Anticoagulation Episode Summary       INR check location:  Anticoagulation Clinic    Preferred lab:      Send INR reminders to:  [facility]    Comments:            Anticoagulation Care Providers       Provider Role Specialty Phone number    Tashia Valverde MD Responsible Cardiology [phone]

## 2023-10-18 ENCOUNTER — ANTI-COAG VISIT (OUTPATIENT)
Age: 73
End: 2023-10-18
Payer: MEDICARE

## 2023-10-18 DIAGNOSIS — Z79.01 LONG TERM (CURRENT) USE OF ANTICOAGULANTS: Primary | ICD-10-CM

## 2023-10-18 DIAGNOSIS — I48.91 ATRIAL FIBRILLATION (HCC): ICD-10-CM

## 2023-10-18 LAB
POC INR: 2.1
PROTHROMBIN TIME, POC: NORMAL

## 2023-10-18 PROCEDURE — 85610 PROTHROMBIN TIME: CPT | Performed by: INTERNAL MEDICINE

## 2023-10-18 PROCEDURE — 93793 ANTICOAG MGMT PT WARFARIN: CPT | Performed by: INTERNAL MEDICINE

## 2023-10-18 NOTE — PATIENT INSTRUCTIONS
Reminder: Please contact the Coumadin Clinic at [phone]  when you have medication changes. Examples, new medications, antibiotics, discontinued medications, new supplements, missed doses of warfarin or if you took extra doses of warfarin. This also includes OTC medications. Notifying us helps reduce the possibility of high and low INR's. In addition, if warfarin needs to be held for any procedures, please have surgeon or physician's office contact us before holding anticoagulant. Thanks, [facility] Cardiology Coumadin Clinic.

## 2023-10-27 ENCOUNTER — NURSE ONLY (OUTPATIENT)
Age: 73
End: 2023-10-27

## 2023-10-27 ENCOUNTER — ANTI-COAG VISIT (OUTPATIENT)
Age: 73
End: 2023-10-27

## 2023-10-27 DIAGNOSIS — I48.91 ATRIAL FIBRILLATION (HCC): ICD-10-CM

## 2023-10-27 DIAGNOSIS — I49.5 TACHY-BRADY SYNDROME (HCC): Primary | ICD-10-CM

## 2023-10-27 DIAGNOSIS — Z79.01 LONG TERM (CURRENT) USE OF ANTICOAGULANTS: Primary | ICD-10-CM

## 2023-10-27 LAB
POC INR: 3.5
PROTHROMBIN TIME, POC: NORMAL

## 2023-10-27 NOTE — PATIENT INSTRUCTIONS
Reminder: Please contact the Coumadin Clinic at [phone]  when you have medication changes. Examples, new medications, antibiotics, discontinued medications, new supplements, missed doses of warfarin or if you took extra doses of warfarin. This also includes OTC medications. Notifying us helps reduce the possibility of high and low INR's. In addition, if warfarin needs to be held for any procedures, please have surgeon or physician's office contact us before holding anticoagulant. Thanks, [facility] Cardiology Coumadin Clinic.

## 2023-10-31 PROCEDURE — 93296 REM INTERROG EVL PM/IDS: CPT | Performed by: INTERNAL MEDICINE

## 2023-10-31 PROCEDURE — 93294 REM INTERROG EVL PM/LDLS PM: CPT | Performed by: INTERNAL MEDICINE

## 2023-11-10 ENCOUNTER — ANTI-COAG VISIT (OUTPATIENT)
Age: 73
End: 2023-11-10

## 2023-11-10 DIAGNOSIS — Z79.01 LONG TERM (CURRENT) USE OF ANTICOAGULANTS: Primary | ICD-10-CM

## 2023-11-10 DIAGNOSIS — I48.91 ATRIAL FIBRILLATION (HCC): ICD-10-CM

## 2023-11-10 LAB
POC INR: 2.5
PROTHROMBIN TIME, POC: NORMAL

## 2023-11-10 NOTE — PATIENT INSTRUCTIONS
Reminder: Please contact the Coumadin Clinic at [phone]  when you have medication changes. Examples, new medications, antibiotics, discontinued medications, new supplements, missed doses of warfarin or if you took extra doses of warfarin. This also includes OTC medications. Notifying us helps reduce the possibility of high and low INR's. In addition, if warfarin needs to be held for any procedures, please have surgeon or physician's office contact us before holding anticoagulant. Thanks, [facility] Cardiology Coumadin Clinic.

## 2023-11-27 ENCOUNTER — ANTI-COAG VISIT (OUTPATIENT)
Age: 73
End: 2023-11-27
Payer: MEDICARE

## 2023-11-27 DIAGNOSIS — I48.91 ATRIAL FIBRILLATION (HCC): ICD-10-CM

## 2023-11-27 DIAGNOSIS — Z79.01 LONG TERM (CURRENT) USE OF ANTICOAGULANTS: Primary | ICD-10-CM

## 2023-11-27 LAB
POC INR: 2.5
PROTHROMBIN TIME, POC: NORMAL

## 2023-11-27 PROCEDURE — 93793 ANTICOAG MGMT PT WARFARIN: CPT | Performed by: INTERNAL MEDICINE

## 2023-11-27 NOTE — PATIENT INSTRUCTIONS
Reminder: Please contact the Coumadin Clinic at [phone]  when you have medication changes. Examples, new medications, antibiotics, discontinued medications, new supplements, missed doses of warfarin or if you took extra doses of warfarin. This also includes OTC medications. Notifying us helps reduce the possibility of high and low INR's. In addition, if warfarin needs to be held for any procedures, please have surgeon or physician's office contact us before holding anticoagulant. Thanks, [facility] Cardiology Coumadin Clinic.

## 2024-01-02 ENCOUNTER — ANTI-COAG VISIT (OUTPATIENT)
Age: 74
End: 2024-01-02
Payer: MEDICARE

## 2024-01-02 DIAGNOSIS — I48.91 ATRIAL FIBRILLATION (HCC): ICD-10-CM

## 2024-01-02 DIAGNOSIS — Z79.01 LONG TERM (CURRENT) USE OF ANTICOAGULANTS: Primary | ICD-10-CM

## 2024-01-02 LAB
POC INR: 1.9
PROTHROMBIN TIME, POC: NORMAL

## 2024-01-02 PROCEDURE — 93793 ANTICOAG MGMT PT WARFARIN: CPT | Performed by: INTERNAL MEDICINE

## 2024-01-02 PROCEDURE — 85610 PROTHROMBIN TIME: CPT | Performed by: INTERNAL MEDICINE

## 2024-01-02 NOTE — PATIENT INSTRUCTIONS
Reminder: Please contact the Coumadin Clinic at [phone]  when you have medication changes. Examples, new medications, antibiotics, discontinued medications, new supplements, missed doses of warfarin or if you took extra doses of warfarin.  This also includes OTC medications. Notifying us helps reduce the possibility of high and low INR's. In addition, if warfarin needs to be held for any procedures, please have surgeon or physician's office contact us before holding anticoagulant. Thanks, [facility] Cardiology Coumadin Clinic.

## 2024-01-23 ENCOUNTER — ANTI-COAG VISIT (OUTPATIENT)
Age: 74
End: 2024-01-23
Payer: MEDICARE

## 2024-01-23 DIAGNOSIS — I48.91 ATRIAL FIBRILLATION (HCC): ICD-10-CM

## 2024-01-23 DIAGNOSIS — Z79.01 LONG TERM (CURRENT) USE OF ANTICOAGULANTS: Primary | ICD-10-CM

## 2024-01-23 LAB
POC INR: 3.2
PROTHROMBIN TIME, POC: NORMAL

## 2024-01-23 PROCEDURE — 85610 PROTHROMBIN TIME: CPT | Performed by: INTERNAL MEDICINE

## 2024-01-23 PROCEDURE — 93793 ANTICOAG MGMT PT WARFARIN: CPT | Performed by: INTERNAL MEDICINE

## 2024-01-23 NOTE — PATIENT INSTRUCTIONS
Reminder: Please contact the Coumadin Clinic at [phone]  when you have medication changes. Examples, new medications, antibiotics, discontinued medications, new supplements, missed doses of warfarin or if you took extra doses of warfarin.  This also includes OTC medications. Notifying us helps reduce the possibility of high and low INR's. In addition, if warfarin needs to be held for any procedures, please have surgeon or physician's office contact us before holding anticoagulant. Thanks, [facility] Cardiology Coumadin Clinic.

## 2024-01-31 ENCOUNTER — OFFICE VISIT (OUTPATIENT)
Age: 74
End: 2024-01-31
Payer: MEDICARE

## 2024-01-31 VITALS
BODY MASS INDEX: 38.8 KG/M2 | DIASTOLIC BLOOD PRESSURE: 74 MMHG | HEIGHT: 68 IN | SYSTOLIC BLOOD PRESSURE: 118 MMHG | HEART RATE: 76 BPM | WEIGHT: 256 LBS

## 2024-01-31 DIAGNOSIS — I48.21 PERMANENT ATRIAL FIBRILLATION (HCC): ICD-10-CM

## 2024-01-31 DIAGNOSIS — I10 ESSENTIAL HYPERTENSION: Primary | ICD-10-CM

## 2024-01-31 DIAGNOSIS — Z79.01 ANTICOAGULANT LONG-TERM USE: ICD-10-CM

## 2024-01-31 DIAGNOSIS — E78.2 MIXED HYPERLIPIDEMIA: ICD-10-CM

## 2024-01-31 PROCEDURE — 4004F PT TOBACCO SCREEN RCVD TLK: CPT | Performed by: INTERNAL MEDICINE

## 2024-01-31 PROCEDURE — G8400 PT W/DXA NO RESULTS DOC: HCPCS | Performed by: INTERNAL MEDICINE

## 2024-01-31 PROCEDURE — 99214 OFFICE O/P EST MOD 30 MIN: CPT | Performed by: INTERNAL MEDICINE

## 2024-01-31 PROCEDURE — G8417 CALC BMI ABV UP PARAM F/U: HCPCS | Performed by: INTERNAL MEDICINE

## 2024-01-31 PROCEDURE — 3078F DIAST BP <80 MM HG: CPT | Performed by: INTERNAL MEDICINE

## 2024-01-31 PROCEDURE — G8428 CUR MEDS NOT DOCUMENT: HCPCS | Performed by: INTERNAL MEDICINE

## 2024-01-31 PROCEDURE — 3074F SYST BP LT 130 MM HG: CPT | Performed by: INTERNAL MEDICINE

## 2024-01-31 PROCEDURE — G8484 FLU IMMUNIZE NO ADMIN: HCPCS | Performed by: INTERNAL MEDICINE

## 2024-01-31 PROCEDURE — 1123F ACP DISCUSS/DSCN MKR DOCD: CPT | Performed by: INTERNAL MEDICINE

## 2024-01-31 PROCEDURE — 1090F PRES/ABSN URINE INCON ASSESS: CPT | Performed by: INTERNAL MEDICINE

## 2024-01-31 PROCEDURE — 93000 ELECTROCARDIOGRAM COMPLETE: CPT | Performed by: INTERNAL MEDICINE

## 2024-01-31 PROCEDURE — 3017F COLORECTAL CA SCREEN DOC REV: CPT | Performed by: INTERNAL MEDICINE

## 2024-01-31 RX ORDER — CARVEDILOL 25 MG/1
25 TABLET ORAL 2 TIMES DAILY WITH MEALS
Qty: 180 TABLET | Refills: 3 | Status: SHIPPED | OUTPATIENT
Start: 2024-01-31

## 2024-01-31 RX ORDER — PRAVASTATIN SODIUM 40 MG
40 TABLET ORAL DAILY
Qty: 90 TABLET | Refills: 3 | Status: SHIPPED | OUTPATIENT
Start: 2024-01-31

## 2024-01-31 RX ORDER — LOSARTAN POTASSIUM AND HYDROCHLOROTHIAZIDE 25; 100 MG/1; MG/1
1 TABLET ORAL DAILY
Qty: 90 TABLET | Refills: 3 | Status: SHIPPED | OUTPATIENT
Start: 2024-01-31

## 2024-01-31 RX ORDER — DILTIAZEM HYDROCHLORIDE 240 MG/1
CAPSULE, COATED, EXTENDED RELEASE ORAL
Qty: 90 CAPSULE | Refills: 3 | Status: SHIPPED | OUTPATIENT
Start: 2024-01-31

## 2024-01-31 ASSESSMENT — ENCOUNTER SYMPTOMS
SHORTNESS OF BREATH: 0
BLOATING: 0
VOMITING: 0
BLURRED VISION: 0
ABDOMINAL PAIN: 0
ORTHOPNEA: 0
HEMOPTYSIS: 0
COUGH: 0
DOUBLE VISION: 0
NAUSEA: 0
BACK PAIN: 0

## 2024-01-31 NOTE — PROGRESS NOTES
100-25 MG per tablet Take 1 tablet by mouth daily 90 tablet 3    dilTIAZem (CARDIZEM CD) 240 MG extended release capsule TAKE 1 CAPSULE BY MOUTH DAILY 90 capsule 3    carvedilol (COREG) 25 MG tablet Take 1 tablet by mouth 2 times daily (with meals) 180 tablet 3    pravastatin (PRAVACHOL) 40 MG tablet Take 1 tablet by mouth daily TAKE 1 TABLET BY MOUTH EVERY NIGHT 90 tablet 3    warfarin (COUMADIN) 5 MG tablet Take 1.5 tablets daily or as directed 135 tablet 3    vitamin D3 (CHOLECALCIFEROL) 125 MCG (5000 UT) TABS tablet Take by mouth daily      DULoxetine (CYMBALTA) 60 MG extended release capsule Take 1 capsule by mouth daily      esomeprazole (NEXIUM) 40 MG delayed release capsule Take 1 capsule by mouth daily      melatonin 3 MG TABS tablet Take 1 tablet by mouth      metFORMIN (GLUCOPHAGE) 500 MG tablet Take by mouth 3 times daily (with meals)      SITagliptin (JANUVIA) 100 MG tablet Take 0.5 tablets by mouth daily      clindamycin (CLEOCIN) 150 MG capsule Take 150 mg by mouth (Patient not taking: Reported on 1/18/2023)      mirabegron (MYRBETRIQ) 25 MG TB24 Take 25 mg by mouth daily (Patient not taking: Reported on 1/31/2024)       No current facility-administered medications for this visit.       Review of Systems   Constitutional: Positive for malaise/fatigue (improved). Negative for chills, decreased appetite, fever and weight gain.   HENT:  Negative for nosebleeds.    Eyes:  Negative for blurred vision and double vision.   Cardiovascular:  Negative for chest pain, claudication, dyspnea on exertion, leg swelling, orthopnea, palpitations, paroxysmal nocturnal dyspnea and syncope.   Respiratory:  Negative for cough, hemoptysis and shortness of breath.    Endocrine: Negative for cold intolerance and heat intolerance.   Hematologic/Lymphatic: Negative for bleeding problem.   Skin:  Negative for rash.   Musculoskeletal:  Negative for back pain, joint pain, muscle weakness and myalgias.   Gastrointestinal:

## 2024-02-05 ENCOUNTER — ANTI-COAG VISIT (OUTPATIENT)
Age: 74
End: 2024-02-05
Payer: MEDICARE

## 2024-02-05 DIAGNOSIS — I48.91 ATRIAL FIBRILLATION (HCC): ICD-10-CM

## 2024-02-05 DIAGNOSIS — Z79.01 LONG TERM (CURRENT) USE OF ANTICOAGULANTS: Primary | ICD-10-CM

## 2024-02-05 LAB
POC INR: 3.1
PROTHROMBIN TIME, POC: YES

## 2024-02-05 PROCEDURE — 85610 PROTHROMBIN TIME: CPT | Performed by: INTERNAL MEDICINE

## 2024-02-05 PROCEDURE — 93793 ANTICOAG MGMT PT WARFARIN: CPT | Performed by: INTERNAL MEDICINE

## 2024-02-05 NOTE — PATIENT INSTRUCTIONS
Reminder: Please contact the Coumadin Clinic at [phone]  when you have medication changes. Examples, new medications, antibiotics, discontinued medications, new supplements, missed doses of warfarin or if you took extra doses of warfarin.  This also includes OTC medications. Notifying us helps reduce the possibility of high and low INR's. In addition, if warfarin needs to be held for any procedures, please have surgeon or physician's office contact us before holding anticoagulant. Thanks, [facility] Cardiology Coumadin Clinic.

## 2024-02-05 NOTE — PROGRESS NOTES
Pt wishes to switch to Eliquis 5 mg BID. Pt informed to hold Warfarin 3 days and then start Eliquis. Plan to start Eliquis on 2/8/24.Pt informed prescription would be signed and sent by Dr. Fajardo. Temp supply sent to local pharmacy. Samples given.

## 2024-02-05 NOTE — PROGRESS NOTES
Anticoagulation Summary  As of 2/5/2024      INR goal:  2.0-3.0   TTR:  75.1 % (1.7 y)   INR used for dosing:  3.1 (2/5/2024)   Warfarin maintenance plan:  5 mg (5 mg x 1) every Wed, Sat; 7.5 mg (5 mg x 1.5) all other days   Weekly warfarin total:  47.5 mg   Plan last modified:  Lilliana Roa MA (11/1/2022)   Next INR check:  2/19/2024   Target end date:  Indefinite    Indications    Long term (current) use of anticoagulants [Z79.01]  Atrial fibrillation ([facility]) [I48.91]                 Anticoagulation Episode Summary       INR check location:  Anticoagulation Clinic    Preferred lab:      Send INR reminders to:  [facility] CARDIOLOGY ITA [facility]ARRIS    Comments:            Anticoagulation Care Providers       Provider Role Specialty Phone number    Darryl Fajardo MD [facility] Cardiology [phone]

## 2024-02-05 NOTE — TELEPHONE ENCOUNTER
Pt came in for INR check today. Pt was supposed to start Eliquis, but now has concerns after doing research. She states that she read you should not take Eliquis if you have kidney disease. States she doesn't know what she should do now.

## 2024-04-16 ENCOUNTER — ANTI-COAG VISIT (OUTPATIENT)
Age: 74
End: 2024-04-16

## 2024-04-16 DIAGNOSIS — Z79.01 LONG TERM (CURRENT) USE OF ANTICOAGULANTS: Primary | ICD-10-CM

## 2024-04-16 DIAGNOSIS — I48.91 ATRIAL FIBRILLATION (HCC): ICD-10-CM

## 2024-04-29 ENCOUNTER — TELEPHONE (OUTPATIENT)
Age: 74
End: 2024-04-29

## 2024-05-02 NOTE — TELEPHONE ENCOUNTER
Pt states she is on Eliquis now, but keeps getting letters about having INR checked. Pt informed that that I would take care of this for her.

## 2024-05-02 NOTE — TELEPHONE ENCOUNTER
Patient was taken out of anti coag clinic on 4/16/24. Letters will not be sent anymore to this patient

## 2024-05-15 PROCEDURE — 93296 REM INTERROG EVL PM/IDS: CPT | Performed by: INTERNAL MEDICINE

## 2024-05-15 PROCEDURE — 93294 REM INTERROG EVL PM/LDLS PM: CPT | Performed by: INTERNAL MEDICINE

## 2024-05-31 ENCOUNTER — NURSE ONLY (OUTPATIENT)
Age: 74
End: 2024-05-31

## 2024-05-31 DIAGNOSIS — I49.5 TACHY-BRADY SYNDROME (HCC): Primary | ICD-10-CM

## 2024-08-28 PROCEDURE — 93294 REM INTERROG EVL PM/LDLS PM: CPT | Performed by: INTERNAL MEDICINE

## 2024-08-28 PROCEDURE — 93296 REM INTERROG EVL PM/IDS: CPT | Performed by: INTERNAL MEDICINE

## 2024-11-22 ENCOUNTER — NURSE ONLY (OUTPATIENT)
Age: 74
End: 2024-11-22

## 2024-11-22 DIAGNOSIS — I49.5 SSS (SICK SINUS SYNDROME) (HCC): Primary | ICD-10-CM

## 2024-11-28 PROCEDURE — 93296 REM INTERROG EVL PM/IDS: CPT | Performed by: INTERNAL MEDICINE

## 2024-11-28 PROCEDURE — 93294 REM INTERROG EVL PM/LDLS PM: CPT | Performed by: INTERNAL MEDICINE

## 2025-01-20 ENCOUNTER — TELEPHONE (OUTPATIENT)
Age: 75
End: 2025-01-20

## 2025-01-20 NOTE — TELEPHONE ENCOUNTER
MEDICATION REFILL REQUEST          Name of Medication:  ELIQUIS   Dose:  5 mg  Frequency:  Twice daily  Quantity:     Days' supply:             Pharmacy Name/Location:  [facility] [place] [facility]?

## 2025-02-05 ENCOUNTER — OFFICE VISIT (OUTPATIENT)
Age: 75
End: 2025-02-05
Payer: MEDICARE

## 2025-02-05 VITALS
HEART RATE: 79 BPM | HEIGHT: 68 IN | DIASTOLIC BLOOD PRESSURE: 60 MMHG | WEIGHT: 245 LBS | SYSTOLIC BLOOD PRESSURE: 130 MMHG | BODY MASS INDEX: 37.13 KG/M2

## 2025-02-05 DIAGNOSIS — I48.21 PERMANENT ATRIAL FIBRILLATION (HCC): Primary | ICD-10-CM

## 2025-02-05 DIAGNOSIS — E78.2 MIXED HYPERLIPIDEMIA: ICD-10-CM

## 2025-02-05 DIAGNOSIS — Z79.01 ANTICOAGULANT LONG-TERM USE: ICD-10-CM

## 2025-02-05 PROCEDURE — 93000 ELECTROCARDIOGRAM COMPLETE: CPT | Performed by: INTERNAL MEDICINE

## 2025-02-05 PROCEDURE — 1123F ACP DISCUSS/DSCN MKR DOCD: CPT | Performed by: INTERNAL MEDICINE

## 2025-02-05 PROCEDURE — 99214 OFFICE O/P EST MOD 30 MIN: CPT | Performed by: INTERNAL MEDICINE

## 2025-02-05 PROCEDURE — 3017F COLORECTAL CA SCREEN DOC REV: CPT | Performed by: INTERNAL MEDICINE

## 2025-02-05 PROCEDURE — 1090F PRES/ABSN URINE INCON ASSESS: CPT | Performed by: INTERNAL MEDICINE

## 2025-02-05 PROCEDURE — 1126F AMNT PAIN NOTED NONE PRSNT: CPT | Performed by: INTERNAL MEDICINE

## 2025-02-05 PROCEDURE — G8400 PT W/DXA NO RESULTS DOC: HCPCS | Performed by: INTERNAL MEDICINE

## 2025-02-05 PROCEDURE — G8417 CALC BMI ABV UP PARAM F/U: HCPCS | Performed by: INTERNAL MEDICINE

## 2025-02-05 PROCEDURE — 1036F TOBACCO NON-USER: CPT | Performed by: INTERNAL MEDICINE

## 2025-02-05 PROCEDURE — 3075F SYST BP GE 130 - 139MM HG: CPT | Performed by: INTERNAL MEDICINE

## 2025-02-05 PROCEDURE — G8428 CUR MEDS NOT DOCUMENT: HCPCS | Performed by: INTERNAL MEDICINE

## 2025-02-05 PROCEDURE — 3078F DIAST BP <80 MM HG: CPT | Performed by: INTERNAL MEDICINE

## 2025-02-05 RX ORDER — CARVEDILOL 25 MG/1
25 TABLET ORAL 2 TIMES DAILY WITH MEALS
Qty: 180 TABLET | Refills: 3 | Status: SHIPPED | OUTPATIENT
Start: 2025-02-05

## 2025-02-05 RX ORDER — LOSARTAN POTASSIUM AND HYDROCHLOROTHIAZIDE 25; 100 MG/1; MG/1
1 TABLET ORAL DAILY
Qty: 90 TABLET | Refills: 3 | Status: CANCELLED | OUTPATIENT
Start: 2025-02-05

## 2025-02-05 RX ORDER — LOSARTAN POTASSIUM AND HYDROCHLOROTHIAZIDE 12.5; 1 MG/1; MG/1
1 TABLET ORAL DAILY
Qty: 90 TABLET | Refills: 3 | Status: SHIPPED | OUTPATIENT
Start: 2025-02-05

## 2025-02-05 RX ORDER — DILTIAZEM HYDROCHLORIDE 240 MG/1
CAPSULE, COATED, EXTENDED RELEASE ORAL
Qty: 90 CAPSULE | Refills: 3 | Status: SHIPPED | OUTPATIENT
Start: 2025-02-05

## 2025-02-05 RX ORDER — PRAVASTATIN SODIUM 40 MG
40 TABLET ORAL DAILY
Qty: 90 TABLET | Refills: 3 | Status: SHIPPED | OUTPATIENT
Start: 2025-02-05

## 2025-02-05 ASSESSMENT — ENCOUNTER SYMPTOMS
HEMOPTYSIS: 0
COUGH: 0
DOUBLE VISION: 0
VOMITING: 0
BLURRED VISION: 0
ORTHOPNEA: 0
ABDOMINAL PAIN: 0
BLOATING: 0
NAUSEA: 0
SHORTNESS OF BREATH: 0
BACK PAIN: 0

## 2025-02-05 NOTE — PROGRESS NOTES
[facility] CARDIOLOGY  [address] [place] [address], [address]  [place] [address]  PHONE: [phone]    25    NAME:  Michelle Kaplan  : 1950  MRN: [id number]         SUBJECTIVE:   Michelle Kaplan is a 74 y.o. female seen for a visit regarding the following:     Chief Complaint   Patient presents with    Atrial Fibrillation    Hypertension       HPI:      Prior hx of permanent A. Fib on coumadin. Per history, prior failed DCCV and anti-arrhythmics (she is unsure of which one) and  lower ext edema  noted with diltiazem.  Also history of DM, HTN, SSS s/p PM (; <1% V pacing). Echo with preserved EF and mod to sev LAE (; mild MR). Also JOE on CPAP.  Prior Holter with average heart rate of 96 [; since added cardizem as well].  Negative Cardiolite stress test from 10/18.  Echocardiogram reviewed from 2021 with preserved EF and mild mitral regurgitation.  Underwent generator exchange from 2021     Doing well since last visit.  Well-controlled heart rates at rest.  Tolerating anticoagulation with eliquis.  Some reported intermittent asymptomatic lower Bps with no noted systolics below 100.  States saw nephrology with some consideration for decreasing Hyzaar.    Prior--Underwent rt knee replacement (). No CP; still some impeded by knee pain.  Much improved HRs since starting low dose cardizem previously.  Stable fatigue     Prior--Some prior issues with fatigue; +snoring-set up with sleep study and sev JOE; much improved fatigue on CPAP.       Perm afib-controlled, HTN-controlled, SSS-controlled, JOE-controlled, fatigue-controlled     Prior nuclear stress in  unremarkable, and echo with preserved LV function with mod MR and LA size.       Past Medical History, Past Surgical History, Family history, Social History, and Medications were all reviewed with the patient today and updated as necessary.     Allergies   Allergen Reactions    Ciprofloxacin Other (See Comments)

## 2025-02-27 LAB
BUN SERPL-MCNC: 25 MG/DL (ref 8–27)
BUN/CREAT SERPL: 19 (ref 12–28)
CALCIUM SERPL-MCNC: 9.9 MG/DL (ref 8.7–10.3)
CHLORIDE SERPL-SCNC: 100 MMOL/L (ref 96–106)
CO2 SERPL-SCNC: 22 MMOL/L (ref 20–29)
CREAT SERPL-MCNC: 1.29 MG/DL (ref 0.57–1)
EGFRCR SERPLBLD CKD-EPI 2021: 44 ML/MIN/1.73
GLUCOSE SERPL-MCNC: 96 MG/DL (ref 70–99)
POTASSIUM SERPL-SCNC: 4.9 MMOL/L (ref 3.5–5.2)
SODIUM SERPL-SCNC: 141 MMOL/L (ref 134–144)
SPECIMEN STATUS REPORT: NORMAL

## 2025-08-28 PROCEDURE — 93294 REM INTERROG EVL PM/LDLS PM: CPT | Performed by: INTERNAL MEDICINE

## 2025-08-28 PROCEDURE — 93296 REM INTERROG EVL PM/IDS: CPT | Performed by: INTERNAL MEDICINE
